# Patient Record
Sex: MALE | Race: WHITE | NOT HISPANIC OR LATINO | Employment: OTHER | URBAN - METROPOLITAN AREA
[De-identification: names, ages, dates, MRNs, and addresses within clinical notes are randomized per-mention and may not be internally consistent; named-entity substitution may affect disease eponyms.]

---

## 2018-08-29 ENCOUNTER — OFFICE VISIT (OUTPATIENT)
Dept: INTERNAL MEDICINE | Facility: CLINIC | Age: 63
End: 2018-08-29

## 2018-08-29 ENCOUNTER — OFFICE VISIT (OUTPATIENT)
Dept: BEHAVIORAL HEALTH | Facility: CLINIC | Age: 63
End: 2018-08-29

## 2018-08-29 VITALS
HEIGHT: 68 IN | TEMPERATURE: 98 F | DIASTOLIC BLOOD PRESSURE: 84 MMHG | SYSTOLIC BLOOD PRESSURE: 154 MMHG | RESPIRATION RATE: 16 BRPM | WEIGHT: 206 LBS | BODY MASS INDEX: 31.22 KG/M2 | OXYGEN SATURATION: 98 % | HEART RATE: 68 BPM

## 2018-08-29 DIAGNOSIS — I10 ESSENTIAL HYPERTENSION: ICD-10-CM

## 2018-08-29 DIAGNOSIS — F43.10 PTSD (POST-TRAUMATIC STRESS DISORDER): ICD-10-CM

## 2018-08-29 PROCEDURE — 99203 OFFICE O/P NEW LOW 30 MIN: CPT | Performed by: NURSE PRACTITIONER

## 2018-08-29 ASSESSMENT — ANXIETY QUESTIONNAIRES
3. WORRYING TOO MUCH ABOUT DIFFERENT THINGS: NEARLY EVERY DAY
7. FEELING AFRAID AS IF SOMETHING AWFUL MIGHT HAPPEN: NOT AT ALL
2. NOT BEING ABLE TO STOP OR CONTROL WORRYING: NEARLY EVERY DAY
5. BEING SO RESTLESS THAT IT IS HARD TO SIT STILL: NOT AT ALL
IF YOU CHECKED OFF ANY PROBLEMS ON THIS QUESTIONNAIRE, HOW DIFFICULT HAVE THESE PROBLEMS MADE IT FOR YOU TO DO YOUR WORK, TAKE CARE OF THINGS AT HOME, OR GET ALONG WITH OTHER PEOPLE: SOMEWHAT DIFFICULT
1. FEELING NERVOUS, ANXIOUS, OR ON EDGE: NEARLY EVERY DAY
6. BECOMING EASILY ANNOYED OR IRRITABLE: NEARLY EVERY DAY
GAD7 TOTAL SCORE: 12

## 2018-08-29 ASSESSMENT — PATIENT HEALTH QUESTIONNAIRE - PHQ9: 5. POOR APPETITE OR OVEREATING: NOT AT ALL

## 2018-08-29 NOTE — PROGRESS NOTES
"  SUBJECTIVE:   Rajeev Montalvo is a 62 year old male who presents to clinic today for the following health issues:      New Patient/Transfer of Care    Elevated BP at DOT physical  WG readings 150-200/  YULI diet, no exercise  Non smoker, frequent use marijuana \"butter\"  Anxiety      Problem list and histories reviewed & adjusted, as indicated.  Additional history: as documented    Patient Active Problem List   Diagnosis     HTN (hypertension)     History reviewed. No pertinent surgical history.    Social History   Substance Use Topics     Smoking status: Former Smoker     Smokeless tobacco: Never Used      Comment: marijuanna     Alcohol use Yes      Comment: rarely     Family History   Problem Relation Age of Onset     HEART DISEASE Father          No current outpatient prescriptions on file.     BP Readings from Last 3 Encounters:   08/29/18 154/84    Wt Readings from Last 3 Encounters:   08/29/18 206 lb (93.4 kg)                    Reviewed and updated as needed this visit by clinical staff  Tobacco  Allergies  Meds  Med Hx  Surg Hx  Fam Hx  Soc Hx      Reviewed and updated as needed this visit by Provider         ROS:  Constitutional, HEENT, cardiovascular, pulmonary, gi and gu systems are negative, except as otherwise noted.    OBJECTIVE:     /84 (BP Location: Right arm, Patient Position: Sitting, Cuff Size: Adult Large)  Pulse 68  Temp 98  F (36.7  C) (Oral)  Resp 16  Ht 5' 8\" (1.727 m)  Wt 206 lb (93.4 kg)  SpO2 98%  BMI 31.32 kg/m2  Body mass index is 31.32 kg/(m^2).  GENERAL: healthy, alert and no distress  NECK: no adenopathy, no asymmetry, masses, or scars and thyroid normal to palpation  RESP: lungs clear to auscultation - no rales, rhonchi or wheezes  CV: regular rate and rhythm, normal S1 S2, no S3 or S4, no murmur, click or rub, no peripheral edema and peripheral pulses strong  PSYCH: tangential and anxious        ASSESSMENT/PLAN:               ICD-10-CM    1. Essential " hypertension I10        YULI diet, exercise, wt loss  F/u BP 2-4 weeks  Warm hand off to Reba [facility] [place]    Triny Patel NP  [facility]

## 2018-08-29 NOTE — MR AVS SNAPSHOT
"              After Visit Summary   2018    Rajeev Montalvo    MRN: [id number]           Patient Information     Date Of Birth          1955        Visit Information        Provider Department      2018 11:00 AM Reba Sorto LMFT [facility]        Today's Diagnoses     PTSD (post-traumatic stress disorder)           Follow-ups after your visit        Who to contact     If you have questions or need follow up information about today's clinic visit or your schedule please contact [facility] directly at [phone].  Normal or non-critical lab and imaging results will be communicated to you by [employer], letter or phone within 4 business days after the clinic has received the results. If you do not hear from us within 7 days, please contact the clinic through [employer] or phone. If you have a critical or abnormal lab result, we will notify you by phone as soon as possible.  Submit refill requests through [employer] or call your pharmacy and they will forward the refill request to us. Please allow 3 business days for your refill to be completed.          Additional Information About Your Visit        MyChart Information     [employer] lets you send messages to your doctor, view your test results, renew your prescriptions, schedule appointments and more. To sign up, go to [email or web address] . Click on \"Log in\" on the left side of the screen, which will take you to the Welcome page. Then click on \"Sign up Now\" on the right side of the page.     You will be asked to enter the access code listed below, as well as some personal information. Please follow the directions to create your username and password.     Your access code is: [email or web address]  Expires: 2018 10:06 AM     Your access code will  in 90 days. If you need help or a new code, please call your [facility] or [phone].        Care EveryWhere ID     This is your Care EveryWhere ID. This could be " used by other organizations to access your [place] medical records  [id number]         Blood Pressure from Last 3 Encounters:   08/29/18 154/84    Weight from Last 3 Encounters:   08/29/18 93.4 kg (206 lb)              Today, you had the following     No orders found for display       Primary Care Provider Office Phone # Fax #    [facility] [phone]       [address] [place] [address]        Equal Access to Services     TULIO CLEMENT : Hadii aad ku hadasho Soomaali, waaxda luqadaha, qaybta kaalmada adeegyada, waxay idiin hayaan adeeg kharash la'aan . So [facility] [phone].    ATENCIÓN: Si habla español, tiene a hart disposición servicios gratuitos de asistencia lingüística. Giana al [phone].    We comply with applicable federal civil rights laws and [place] laws. We do not discriminate on the basis of race, color, national origin, age, disability, sex, sexual orientation, or gender identity.            Thank you!     Thank you for choosing [facility]  for your care. Our goal is always to provide you with excellent care. Hearing back from our patients is one way we can continue to improve our services. Please take a few minutes to complete the written survey that you may receive in the mail after your visit with us. Thank you!             Your Updated Medication List - Protect others around you: Learn how to safely use, store and throw away your medicines at www.disposemymeds.org.      Notice  As of 8/29/2018  3:05 PM    You have not been prescribed any medications.

## 2018-08-29 NOTE — PROGRESS NOTES
Patient had appointment with his/her primary care physician. [facility] services were requested / offered. No immediate safety/risk issues were reported or identified.  Explained the role of the [facility] and provided informational handout and contact information for the [facility]. Pt feels like therapy might be beneficial. Referred to pt to EMDR for his PTSD    Reba Sorto, Behavioral Health Clinician

## 2018-08-29 NOTE — MR AVS SNAPSHOT
"              After Visit Summary   2018    Rajeev Montalvo    MRN: [id number]           Patient Information     Date Of Birth          1955        Visit Information        Provider Department      2018 10:40 AM Triny Patel NP [facility]        Today's Diagnoses     Essential hypertension           Follow-ups after your visit        Who to contact     If you have questions or need follow up information about today's clinic visit or your schedule please contact [facility] directly at [phone].  Normal or non-critical lab and imaging results will be communicated to you by [employer], letter or phone within 4 business days after the clinic has received the results. If you do not hear from us within 7 days, please contact the clinic through [employer] or phone. If you have a critical or abnormal lab result, we will notify you by phone as soon as possible.  Submit refill requests through [employer] or call your pharmacy and they will forward the refill request to us. Please allow 3 business days for your refill to be completed.          Additional Information About Your Visit        MyChart Information     [employer] lets you send messages to your doctor, view your test results, renew your prescriptions, schedule appointments and more. To sign up, go to [email or web address] . Click on \"Log in\" on the left side of the screen, which will take you to the Welcome page. Then click on \"Sign up Now\" on the right side of the page.     You will be asked to enter the access code listed below, as well as some personal information. Please follow the directions to create your username and password.     Your access code is: [email or web address]  Expires: 2018 10:06 AM     Your access code will  in 90 days. If you need help or a new code, please call your [facility] or [phone].        Care EveryWhere ID     This is your Care EveryWhere ID. This could be used by other " "organizations to access your [place] medical records  [id number]        Your Vitals Were     Pulse Temperature Respirations Height Pulse Oximetry BMI (Body Mass Index)    68 98  F (36.7  C) (Oral) 16 5' 8\" (1.727 m) 98% 31.32 kg/m2       Blood Pressure from Last 3 Encounters:   08/29/18 154/84    Weight from Last 3 Encounters:   08/29/18 206 lb (93.4 kg)              Today, you had the following     No orders found for display       Primary Care Provider Office Phone # Fax #    [facility] [phone]       [address] [place] [address]        Equal Access to Services     TULIO CLEMENT : Hadii fareed ruby Soelsy, waaxda luqadaha, qaybta kaalmada adeegyada, yelena martinez. So [facility] [phone].    ATENCIÓN: Si habla español, tiene a hart disposición servicios gratuitos de asistencia lingüística. Llame al [phone].    We comply with applicable federal civil rights laws and [place] laws. We do not discriminate on the basis of race, color, national origin, age, disability, sex, sexual orientation, or gender identity.            Thank you!     Thank you for choosing [facility]  for your care. Our goal is always to provide you with excellent care. Hearing back from our patients is one way we can continue to improve our services. Please take a few minutes to complete the written survey that you may receive in the mail after your visit with us. Thank you!             Your Updated Medication List - Protect others around you: Learn how to safely use, store and throw away your medicines at www.disposemymeds.org.      Notice  As of 8/29/2018 11:26 AM    You have not been prescribed any medications.      "

## 2018-08-30 ASSESSMENT — PATIENT HEALTH QUESTIONNAIRE - PHQ9: SUM OF ALL RESPONSES TO PHQ QUESTIONS 1-9: 15

## 2018-08-30 ASSESSMENT — ANXIETY QUESTIONNAIRES: GAD7 TOTAL SCORE: 12

## 2018-11-05 ENCOUNTER — TELEPHONE (OUTPATIENT)
Dept: INTERNAL MEDICINE | Facility: CLINIC | Age: 63
End: 2018-11-05

## 2018-11-05 ENCOUNTER — OFFICE VISIT (OUTPATIENT)
Dept: INTERNAL MEDICINE | Facility: CLINIC | Age: 63
End: 2018-11-05

## 2018-11-05 VITALS
BODY MASS INDEX: 30.49 KG/M2 | RESPIRATION RATE: 16 BRPM | HEART RATE: 63 BPM | OXYGEN SATURATION: 97 % | SYSTOLIC BLOOD PRESSURE: 146 MMHG | WEIGHT: 201.2 LBS | TEMPERATURE: 98.3 F | DIASTOLIC BLOOD PRESSURE: 80 MMHG | HEIGHT: 68 IN

## 2018-11-05 DIAGNOSIS — I10 ESSENTIAL HYPERTENSION: Primary | ICD-10-CM

## 2018-11-05 PROCEDURE — 99213 OFFICE O/P EST LOW 20 MIN: CPT | Performed by: NURSE PRACTITIONER

## 2018-11-05 RX ORDER — HYDROCHLOROTHIAZIDE 25 MG/1
25 TABLET ORAL DAILY
Qty: 90 TABLET | Refills: 1 | Status: SHIPPED | OUTPATIENT
Start: 2018-11-05 | End: 2023-12-08

## 2018-11-05 NOTE — MR AVS SNAPSHOT
"              After Visit Summary   11/5/2018    Rajeev Montalvo    MRN: [id number]           Patient Information     Date Of Birth          1955        Visit Information        Provider Department      11/5/2018 1:20 PM Triny Patel NP [facility]        Today's Diagnoses     Essential hypertension    -  1       Follow-ups after your visit        Who to contact     If you have questions or need follow up information about today's clinic visit or your schedule please contact [facility] directly at [phone].  Normal or non-critical lab and imaging results will be communicated to you by MyChart, letter or phone within 4 business days after the clinic has received the results. If you do not hear from us within 7 days, please contact the clinic through MyChart or phone. If you have a critical or abnormal lab result, we will notify you by phone as soon as possible.  Submit refill requests through [employer] or call your pharmacy and they will forward the refill request to us. Please allow 3 business days for your refill to be completed.          Additional Information About Your Visit        Care EveryWhere ID     This is your Care EveryWhere ID. This could be used by other organizations to access your [place] medical records  [id number]        Your Vitals Were     Pulse Temperature Respirations Height Pulse Oximetry BMI (Body Mass Index)    63 98.3  F (36.8  C) (Oral) 16 5' 8\" (1.727 m) 97% 30.59 kg/m2       Blood Pressure from Last 3 Encounters:   11/05/18 146/80   08/29/18 154/84    Weight from Last 3 Encounters:   11/05/18 201 lb 3.2 oz (91.3 kg)   08/29/18 206 lb (93.4 kg)              Today, you had the following     No orders found for display         Today's Medication Changes          These changes are accurate as of 11/5/18  3:03 PM.  If you have any questions, ask your nurse or doctor.               Start taking these medicines.        Dose/Directions    " hydrochlorothiazide 25 MG tablet   Commonly known as:  HYDRODIURIL   Used for:  Essential hypertension   Started by:  Triny Patel NP        Dose:  25 mg   Take 1 tablet (25 mg) by mouth daily   Quantity:  90 tablet   Refills:  1            Where to get your medicines      These medications were sent to [id number] [address] [facility]  [address] [facility] [address]     Phone:  [phone]     hydrochlorothiazide 25 MG tablet                Primary Care Provider Office Phone # Fax #    [facility] [phone]       [address] [place] [address]        Equal Access to Services     AMINA CLEMENT : Hadii fareed jackson hadasho Soomaali, waaxda luqadaha, qaybta kaalmada adeegyada, yelena martinez. So [facility] [phone].    ATENCIÓN: Si habla español, tiene a hart disposición servicios gratuitos de asistencia lingüística. Llame al [phone].    We comply with applicable federal civil rights laws and [place] laws. We do not discriminate on the basis of race, color, national origin, age, disability, sex, sexual orientation, or gender identity.            Thank you!     Thank you for choosing [facility]  for your care. Our goal is always to provide you with excellent care. Hearing back from our patients is one way we can continue to improve our services. Please take a few minutes to complete the written survey that you may receive in the mail after your visit with us. Thank you!             Your Updated Medication List - Protect others around you: Learn how to safely use, store and throw away your medicines at www.disposemymeds.org.          This list is accurate as of 11/5/18  3:03 PM.  Always use your most recent med list.                   Brand Name Dispense Instructions for use Diagnosis    hydrochlorothiazide 25 MG tablet    HYDRODIURIL    90 tablet    Take 1 tablet (25 mg) by mouth daily     Essential hypertension

## 2018-11-05 NOTE — TELEPHONE ENCOUNTER
PT calls stating he bought a BP cuff since his last OV.    His BP has been elevated since.     180/100, 160/90.     He has no insurance. He is not on any BP medication.   Reviewed with Triny Patel NP.     He needs OV, since need to check BP in office.     Scheduled for today.     He agrees.

## 2018-11-05 NOTE — PROGRESS NOTES
"  SUBJECTIVE:   Rajeev Montalvo is a 63 year old male who presents to clinic today for the following health issues:      Hypertension Follow-up      Outpatient blood pressures are being checked at home.  Results are 160-180/80-95.    Low Salt Diet: no added salt      Amount of exercise or physical activity: very physical job, able to lose 5#    Problems taking medications regularly: NA    Medication side effects: not applicable    Diet: low salt            Problem list and histories reviewed & adjusted, as indicated.  Additional history: as documented    Patient Active Problem List   Diagnosis     HTN (hypertension)     PTSD (post-traumatic stress disorder)     History reviewed. No pertinent surgical history.    Social History   Substance Use Topics     Smoking status: Former Smoker     Smokeless tobacco: Never Used      Comment: marijuanna     Alcohol use Yes      Comment: rarely     Family History   Problem Relation Age of Onset     HEART DISEASE Father          Current Outpatient Prescriptions   Medication Sig Dispense Refill     hydrochlorothiazide (HYDRODIURIL) 25 MG tablet Take 1 tablet (25 mg) by mouth daily 90 tablet 1     BP Readings from Last 3 Encounters:   11/05/18 146/80   08/29/18 154/84    Wt Readings from Last 3 Encounters:   11/05/18 201 lb 3.2 oz (91.3 kg)   08/29/18 206 lb (93.4 kg)                    Reviewed and updated as needed this visit by clinical staff  Tobacco  Allergies  Meds  Med Hx  Surg Hx  Fam Hx  Soc Hx      Reviewed and updated as needed this visit by Provider         ROS:  Constitutional, HEENT, cardiovascular, pulmonary, gi and gu systems are negative, except as otherwise noted.    OBJECTIVE:     /80 (BP Location: Left arm, Patient Position: Sitting, Cuff Size: Adult Regular)  Pulse 63  Temp 98.3  F (36.8  C) (Oral)  Resp 16  Ht 5' 8\" (1.727 m)  Wt 201 lb 3.2 oz (91.3 kg)  SpO2 97%  BMI 30.59 kg/m2  Body mass index is 30.59 kg/(m^2).  GENERAL: healthy, alert " and no distress  RESP: lungs clear to auscultation - no rales, rhonchi or wheezes  CV: regular rate and rhythm, normal S1 S2, no S3 or S4, no murmur, click or rub, no peripheral edema and peripheral pulses strong        ASSESSMENT/PLAN:               ICD-10-CM    1. Essential hypertension I10 hydrochlorothiazide (HYDRODIURIL) 25 MG tablet       Pt  Reluctant to take any meds due to cost and SE.  Moving to [facility] will continue wt loss efforts and start hydrochlorothiazide if readings not improving.  Goal <135/85    Triny Patel NP  [facility]

## 2019-04-25 ENCOUNTER — TELEPHONE (OUTPATIENT)
Dept: INTERNAL MEDICINE | Facility: CLINIC | Age: 64
End: 2019-04-25

## 2019-04-25 NOTE — LETTER
[facility]  [address], [address]  [place], [place]  [address]                                            TEL:[phone]  FAX:[phone]      Rajeev Montalvo  [address] [address]      April 25, 2019    Dear Rajeev,       At [facility] we care about your health and well-being.  A review of your chart has indicated that you are due for a blood pressure follow up and colonoscopy.  Please contact us at [phone] to schedule an appointment.    If you have already had one or all of the above screening tests at another facility, please call us to update your chart.       Sincerely,      Your [facility] Team

## 2019-04-25 NOTE — TELEPHONE ENCOUNTER
Panel Management Review      Patient has the following on his problem list:     Hypertension   Last three blood pressure readings:  BP Readings from Last 3 Encounters:   11/05/18 146/80   08/29/18 154/84     Blood pressure: FAILED    HTN Guidelines:  Less than 140/90      Composite cancer screening  Chart review shows that this patient is due/due soon for the following Colonoscopy  Summary:    Patient is due/failing the following:   BP CHECK and COLONOSCOPY    Action needed:   Patient needs office visit for blood pressure follow up. Colonoscopy.    Type of outreach:    Sent letter.    Questions for provider review:    None                                                                                                                                    [facility]       Chart routed to closed .

## 2019-08-05 ENCOUNTER — NURSE TRIAGE (OUTPATIENT)
Dept: INTERNAL MEDICINE | Facility: CLINIC | Age: 64
End: 2019-08-05

## 2019-08-05 DIAGNOSIS — I10 ESSENTIAL HYPERTENSION: Primary | ICD-10-CM

## 2019-08-05 RX ORDER — AMLODIPINE BESYLATE 10 MG/1
10 TABLET ORAL DAILY
Qty: 30 TABLET | Refills: 0 | Status: SHIPPED | OUTPATIENT
Start: 2019-08-05 | End: 2019-09-09

## 2019-08-05 RX ORDER — AMLODIPINE BESYLATE 10 MG/1
10 TABLET ORAL DAILY
Qty: 90 TABLET | Refills: 0 | Status: CANCELLED
Start: 2019-08-05

## 2019-08-05 NOTE — TELEPHONE ENCOUNTER
Contacted patient, he is not having any pain from the tooth extraction. Informed patient Dr. Estrada recommends restarting BP medication and offered prescription for Norvasc as alternative to hydrochlorothiazide if this was not helping. Follow-up in 1-2 weeks.     Patient would like to try Norvasc as an alternative, please send prescription to [facility].     Offered to assist patient with scheduling follow-up appointment, but he declined.

## 2019-08-05 NOTE — TELEPHONE ENCOUNTER
Is his dental pain under control?  Pain can increase bp.     Recommend patient return on bp medication.    Could have him take norvasc instead if he thinks hydrochlorothiazide was not working.  Recommend he take norvasc medication and follow up in 1-2 weeks.    Patient to go to the ER with any concerning symptoms that were already reviewed in protocol.

## 2019-08-05 NOTE — TELEPHONE ENCOUNTER
"Patient calling.  States his blood pressure has been elevated the past couple days--ranging 200's/100's.  Today (approx 15 min ago) was 203/107 and 5 min later was 189/107 with home blood pressure monitor.  Patient did state his job is very stressful and has been on antibiotics (Zpack and Clindamycin 150mg TID) for infected tooth that was pulled.  States he did stop Clindamycin 8-4-19 d/t thought medication was increasing blood pressure.    Stopped taking his Hydrochlorothiazide 25mg daily medication x \"months\" ago d/t he didn't feel it was helping.    Routed to covering provider d/t Triny Patel not in clinic today.    Last office visit 11-5-18    Please advise, thanks.    Additional Information    Negative: Sounds like a life-threatening emergency to the triager    Negative: Pregnant > 20 weeks and new hand or face swelling    Negative: Pregnant > 20 weeks and BP > 140/90    Negative: Systolic BP >= 160 OR Diastolic >= 100, and any cardiac or neurologic symptoms (e.g., chest pain, difficulty breathing, unsteady gait, blurred vision)    Negative: Patient sounds very sick or weak to the triager    Negative: BP Systolic BP >= 140 OR Diastolic >= 90 and postpartum < 4 weeks    Systolic BP >= 180 OR Diastolic >= 110, and missed most recent dose of blood pressure medication    Answer Assessment - Initial Assessment Questions  1. BLOOD PRESSURE: \"What is the blood pressure?\" \"Did you take at least two measurements 5 minutes apart?\"      Yes--First one was 203/107 and 2nd was 189/107.  2. ONSET: \"When did you take your blood pressure?\"      This morning--15 min ago  3. HOW: \"How did you obtain the blood pressure?\" (e.g., visiting nurse, automatic home BP monitor)      Home blood pressure monitor  4. HISTORY: \"Do you have a history of high blood pressure?\"      Yes  5. MEDICATIONS: \"Are you taking any medications for blood pressure?\" \"Have you missed any doses recently?\"      No--he stopped taking his hydrochlorothiazide " "x \"months\"ago d/t he didn't feel it was helping.  6. OTHER SYMPTOMS: \"Do you have any symptoms?\" (e.g., headache, chest pain, blurred vision, difficulty breathing, weakness)      Denies any other symptoms.  7. PREGNANCY: \"Is there any chance you are pregnant?\" \"When was your last menstrual period?\"      NA    Protocols used: HIGH BLOOD PRESSURE-A-OH      "

## 2019-09-09 DIAGNOSIS — I10 ESSENTIAL HYPERTENSION: ICD-10-CM

## 2019-09-09 RX ORDER — AMLODIPINE BESYLATE 10 MG/1
10 TABLET ORAL DAILY
Qty: 30 TABLET | Refills: 0 | Status: SHIPPED | OUTPATIENT
Start: 2019-09-09 | End: 2019-10-10

## 2019-09-09 NOTE — TELEPHONE ENCOUNTER
"Requested Prescriptions   Pending Prescriptions Disp Refills     amLODIPine (NORVASC) 10 MG tablet  Last Written Prescription Date:  8/5/19  Last Fill Quantity: 30,  # refills: 0   Last office visit: 11/5/2018 with prescribing provider:  Jorge   Future Office Visit:   Next 5 appointments (look out 90 days)    Oct 02, 2019  9:40 AM CDT  SHORT with Shannon Estrada MD  [facility] [address]  [facility] [address]  [phone]          30 tablet 0     Sig: Take 1 tablet (10 mg) by mouth daily       Calcium Channel Blockers Protocol  Failed - 9/9/2019  9:21 AM        Failed - Blood pressure under 140/90 in past 12 months     BP Readings from Last 3 Encounters:   11/05/18 146/80   08/29/18 154/84                 Failed - Normal serum creatinine on file in past 12 months     No lab results found.          Passed - Recent (12 mo) or future (30 days) visit within the authorizing provider's specialty     Patient had office visit in the last 12 months or has a visit in the next 30 days with authorizing provider or within the authorizing provider's specialty.  See \"Patient Info\" tab in inbasket, or \"Choose Columns\" in Meds & Orders section of the refill encounter.              Passed - Medication is active on med list        Passed - Patient is age 18 or older          "

## 2019-09-09 NOTE — TELEPHONE ENCOUNTER
Routing refill request to provider for review/approval because:  Labs not current:  Creatinine  BP elevated

## 2019-10-02 ENCOUNTER — OFFICE VISIT (OUTPATIENT)
Dept: INTERNAL MEDICINE | Facility: CLINIC | Age: 64
End: 2019-10-02

## 2019-10-02 ENCOUNTER — TELEPHONE (OUTPATIENT)
Dept: INTERNAL MEDICINE | Facility: CLINIC | Age: 64
End: 2019-10-02

## 2019-10-02 ENCOUNTER — ANCILLARY PROCEDURE (OUTPATIENT)
Dept: GENERAL RADIOLOGY | Facility: CLINIC | Age: 64
End: 2019-10-02
Attending: INTERNAL MEDICINE

## 2019-10-02 VITALS
DIASTOLIC BLOOD PRESSURE: 72 MMHG | BODY MASS INDEX: 29.55 KG/M2 | TEMPERATURE: 98 F | RESPIRATION RATE: 12 BRPM | SYSTOLIC BLOOD PRESSURE: 122 MMHG | HEART RATE: 80 BPM | OXYGEN SATURATION: 97 % | HEIGHT: 68 IN | WEIGHT: 195 LBS

## 2019-10-02 DIAGNOSIS — R09.89 PHLEGM IN THROAT: ICD-10-CM

## 2019-10-02 DIAGNOSIS — R05.9 COUGH: ICD-10-CM

## 2019-10-02 DIAGNOSIS — Z12.11 COLON CANCER SCREENING: ICD-10-CM

## 2019-10-02 DIAGNOSIS — I10 ESSENTIAL HYPERTENSION: Primary | ICD-10-CM

## 2019-10-02 LAB
BASOPHILS # BLD AUTO: 0 10E9/L (ref 0–0.2)
BASOPHILS NFR BLD AUTO: 0.3 %
DIFFERENTIAL METHOD BLD: NORMAL
EOSINOPHIL # BLD AUTO: 0.3 10E9/L (ref 0–0.7)
EOSINOPHIL NFR BLD AUTO: 3.7 %
ERYTHROCYTE [DISTWIDTH] IN BLOOD BY AUTOMATED COUNT: 13 % (ref 10–15)
HCT VFR BLD AUTO: 43.4 % (ref 40–53)
HGB BLD-MCNC: 15.4 G/DL (ref 13.3–17.7)
LYMPHOCYTES # BLD AUTO: 3.6 10E9/L (ref 0.8–5.3)
LYMPHOCYTES NFR BLD AUTO: 39.5 %
MCH RBC QN AUTO: 31.8 PG (ref 26.5–33)
MCHC RBC AUTO-ENTMCNC: 35.5 G/DL (ref 31.5–36.5)
MCV RBC AUTO: 90 FL (ref 78–100)
MONOCYTES # BLD AUTO: 0.7 10E9/L (ref 0–1.3)
MONOCYTES NFR BLD AUTO: 7.8 %
NEUTROPHILS # BLD AUTO: 4.4 10E9/L (ref 1.6–8.3)
NEUTROPHILS NFR BLD AUTO: 48.7 %
PLATELET # BLD AUTO: 185 10E9/L (ref 150–450)
RBC # BLD AUTO: 4.85 10E12/L (ref 4.4–5.9)
WBC # BLD AUTO: 9.1 10E9/L (ref 4–11)

## 2019-10-02 PROCEDURE — 93000 ELECTROCARDIOGRAM COMPLETE: CPT | Performed by: INTERNAL MEDICINE

## 2019-10-02 PROCEDURE — 85025 COMPLETE CBC W/AUTO DIFF WBC: CPT | Performed by: INTERNAL MEDICINE

## 2019-10-02 PROCEDURE — 80048 BASIC METABOLIC PNL TOTAL CA: CPT | Performed by: INTERNAL MEDICINE

## 2019-10-02 PROCEDURE — 99214 OFFICE O/P EST MOD 30 MIN: CPT | Performed by: INTERNAL MEDICINE

## 2019-10-02 PROCEDURE — 71046 X-RAY EXAM CHEST 2 VIEWS: CPT

## 2019-10-02 PROCEDURE — 36415 COLL VENOUS BLD VENIPUNCTURE: CPT | Performed by: INTERNAL MEDICINE

## 2019-10-02 PROCEDURE — 82043 UR ALBUMIN QUANTITATIVE: CPT | Performed by: INTERNAL MEDICINE

## 2019-10-02 ASSESSMENT — MIFFLIN-ST. JEOR: SCORE: 1659.01

## 2019-10-02 NOTE — PROGRESS NOTES
"Subjective     Rajeev Montalvo is a 63 year old male who presents to clinic today for the following health issues:    HPI   Hypertension Follow-up      Do you check your blood pressure regularly outside of the clinic? Yes     Are you following a low salt diet? No    Are your blood pressures ever more than 140 on the top number (systolic) OR more   than 90 on the bottom number (diastolic), for example 140/90? Yes   >140/90    Does not do regular exercise.       How many servings of fruits and vegetables do you eat daily?  0-1    On average, how many sweetened beverages do you drink each day (soda, juice, sweet tea, etc)?   0    How many days per week do you miss taking your medication? 0    Phlegm.  He has been coughing up phlegm for 10 years. The cough is productive.   No shortness of breath.  He rarely has wheezing.  He notices this when he is sleeping sometimes.  The cough is constant, and not necessarily more at night.   No postnasal drip.  No nasal congestion. No heartburn appreciated.        Reviewed and updated as needed this visit by Provider  Meds         Review of Systems   ROS COMP: CONSTITUTIONAL: NEGATIVE for fever, chills, change in weight  ENT/MOUTH: POS phlegm  RESP: NEGATIVE for significant cough or SOB  CV: NEGATIVE for chest pain, palpitations or peripheral edema      Objective    /72   Pulse 80   Temp 98  F (36.7  C) (Oral)   Resp 12   Ht 1.727 m (5' 8\")   Wt 88.5 kg (195 lb)   SpO2 97%   BMI 29.65 kg/m    Body mass index is 29.65 kg/m .  Physical Exam   GENERAL: healthy, alert and no distress  HEENT: oropharynx without lesions appreciated  NECK: no adenopathy, no asymmetry, masses, or scars and thyroid normal to palpation  RESP: lungs clear to auscultation - no rales, rhonchi or wheezes  CV: regular rate and rhythm, normal S1 S2, no S3 or S4, no murmur, click or rub    Diagnostic Test Results:  Labs reviewed in Epic        Assessment & Plan       (I10) Essential hypertension  " "(primary encounter diagnosis)  Comment: at goal  Plan: Basic metabolic panel, CBC with platelets         differential, Albumin Random Urine Quantitative        with Creat Ratio, EKG 12-lead complete w/read -        Clinics            (R09.89) Phlegm in throat  Comment:   Plan: OTOLARYNGOLOGY REFERRAL            (R05) Cough  Comment: assess for pulmonary etiology  Plan: XR Chest 2 Views            (Z12.11) Colon cancer screening  Comment:   Plan: Fecal colorectal cancer screen (FIT)                 BMI:   Estimated body mass index is 29.65 kg/m  as calculated from the following:    Height as of this encounter: 1.727 m (5' 8\").    Weight as of this encounter: 88.5 kg (195 lb).           See Patient Instructions    Return in about 1 year (around 10/2/2020) for Physical Exam.    Shannon Estrada MD  [facility]    "

## 2019-10-02 NOTE — TELEPHONE ENCOUNTER
Please call patient ASAP with X-ray results. He is very concerned. [phone] (ok to leave detailed message)

## 2019-10-02 NOTE — TELEPHONE ENCOUNTER
"Per 10/2/19 chest xr:  \"Notes recorded by Shannon Estrada MD on 10/2/2019 at 1:33 PM CDT  Chest xray is clear\"    Called patient and advised. He would like to know if primary care provider has any concern for the white spot on xray in the upper left lung, and if primary care provider has an idea of what that spot could be.    Patient can be contacted at [phone]- [phone] to leave a detailed message  "

## 2019-10-03 LAB
ANION GAP SERPL CALCULATED.3IONS-SCNC: 5 MMOL/L (ref 3–14)
BUN SERPL-MCNC: 17 MG/DL (ref 7–30)
CALCIUM SERPL-MCNC: 9.1 MG/DL (ref 8.5–10.1)
CHLORIDE SERPL-SCNC: 102 MMOL/L (ref 94–109)
CO2 SERPL-SCNC: 29 MMOL/L (ref 20–32)
CREAT SERPL-MCNC: 1.03 MG/DL (ref 0.66–1.25)
CREAT UR-MCNC: 243 MG/DL
GFR SERPL CREATININE-BSD FRML MDRD: 77 ML/MIN/{1.73_M2}
GLUCOSE SERPL-MCNC: 121 MG/DL (ref 70–99)
MICROALBUMIN UR-MCNC: 22 MG/L
MICROALBUMIN/CREAT UR: 9.22 MG/G CR (ref 0–17)
POTASSIUM SERPL-SCNC: 3.7 MMOL/L (ref 3.4–5.3)
SODIUM SERPL-SCNC: 136 MMOL/L (ref 133–144)

## 2019-10-03 NOTE — TELEPHONE ENCOUNTER
Called patient and left a message that the area on his lung was a normal variant.    Also explained to him that he had a compression deformity of his lumbar spine that was possibly from an old injury.

## 2019-10-10 DIAGNOSIS — I10 ESSENTIAL HYPERTENSION: ICD-10-CM

## 2019-10-10 RX ORDER — AMLODIPINE BESYLATE 10 MG/1
TABLET ORAL
Qty: 90 TABLET | Refills: 3 | Status: SHIPPED | OUTPATIENT
Start: 2019-10-10

## 2019-10-10 NOTE — TELEPHONE ENCOUNTER
Last OV 10/2/19, /72, per OV notes return in 1 year.  Prescription approved per [facility] Refill Protocol.

## 2019-10-29 ENCOUNTER — HEALTH MAINTENANCE LETTER (OUTPATIENT)
Age: 64
End: 2019-10-29

## 2021-01-15 ENCOUNTER — HEALTH MAINTENANCE LETTER (OUTPATIENT)
Age: 66
End: 2021-01-15

## 2021-09-04 ENCOUNTER — HEALTH MAINTENANCE LETTER (OUTPATIENT)
Age: 66
End: 2021-09-04

## 2021-12-25 ENCOUNTER — HEALTH MAINTENANCE LETTER (OUTPATIENT)
Age: 66
End: 2021-12-25

## 2022-10-16 ENCOUNTER — HEALTH MAINTENANCE LETTER (OUTPATIENT)
Age: 67
End: 2022-10-16

## 2023-04-01 ENCOUNTER — HEALTH MAINTENANCE LETTER (OUTPATIENT)
Age: 68
End: 2023-04-01

## 2023-12-05 ENCOUNTER — NURSE TRIAGE (OUTPATIENT)
Dept: FAMILY MEDICINE | Facility: CLINIC | Age: 68
End: 2023-12-05
Payer: MEDICARE

## 2023-12-05 NOTE — TELEPHONE ENCOUNTER
Patient thinks he has had a stroke.    He is losing the ability to speak 2 months ago.  He states this is getting worse.    He is having trouble moving his tongue for 2 months.    He states he has white bumps on his tongue and his lips.    Per protocol patient advised to go to the ER. He states he does not have insurance. He will not go to urgent care either. He would like to be seen by clinic in [facility].    Thalia Arredondo RN on 12/5/2023 at 10:39 AM      Reason for Disposition   Neurologic deficit that was brief (now gone), ANY of the following: * Weakness of the face, arm, or leg on one side of the body * Numbness of the face, arm, or leg on one side of the body * Loss of speech or garbled speech    Additional Information   Negative: Difficult to awaken or acting confused (e.g., disoriented, slurred speech)   Negative: New neurologic deficit that is present NOW, sudden onset of ANY of the following: * Weakness of the face, arm, or leg on one side of the body* Numbness of the face, arm, or leg on one side of the body* Loss of speech or garbled speech   Negative: Sounds like a life-threatening emergency to the triager   Negative: SEVERE weakness (i.e., unable to walk or barely able to walk, requires support) and new-onset or worsening   Negative: Confusion, disorientation, or hallucinations is main symptom   Negative: Dizziness is main symptom   Negative: Followed a head injury within last 3 days   Negative: Headache (with neurologic deficit)   Negative: Unable to urinate (or only a few drops) and bladder feels very full   Negative: Loss of bladder or bowel control (urine or bowel incontinence; wetting self, leaking stool) of new-onset   Negative: Back pain with numbness (loss of sensation) in groin or rectal area    Protocols used: Neurologic Deficit-A-[place]

## 2023-12-08 ENCOUNTER — HOSPITAL ENCOUNTER (OUTPATIENT)
Dept: MRI IMAGING | Facility: CLINIC | Age: 68
Discharge: HOME | End: 2023-12-08
Attending: PHYSICIAN ASSISTANT
Payer: MEDICARE

## 2023-12-08 ENCOUNTER — OFFICE VISIT (OUTPATIENT)
Dept: PEDIATRICS | Facility: CLINIC | Age: 68
End: 2023-12-08
Payer: MEDICARE

## 2023-12-08 ENCOUNTER — OFFICE VISIT (OUTPATIENT)
Dept: INTERNAL MEDICINE | Facility: CLINIC | Age: 68
End: 2023-12-08
Payer: MEDICARE

## 2023-12-08 VITALS
DIASTOLIC BLOOD PRESSURE: 83 MMHG | SYSTOLIC BLOOD PRESSURE: 165 MMHG | HEART RATE: 51 BPM | RESPIRATION RATE: 18 BRPM | OXYGEN SATURATION: 98 % | TEMPERATURE: 97.6 F | BODY MASS INDEX: 26.61 KG/M2 | WEIGHT: 175 LBS

## 2023-12-08 VITALS
HEART RATE: 70 BPM | OXYGEN SATURATION: 98 % | BODY MASS INDEX: 26.52 KG/M2 | SYSTOLIC BLOOD PRESSURE: 156 MMHG | TEMPERATURE: 98.6 F | HEIGHT: 68 IN | WEIGHT: 175 LBS | DIASTOLIC BLOOD PRESSURE: 78 MMHG | RESPIRATION RATE: 16 BRPM

## 2023-12-08 DIAGNOSIS — K44.9 HIATAL HERNIA: ICD-10-CM

## 2023-12-08 DIAGNOSIS — R47.9 SPEECH DISTURBANCE, UNSPECIFIED TYPE: Primary | ICD-10-CM

## 2023-12-08 DIAGNOSIS — K21.00 GASTROESOPHAGEAL REFLUX DISEASE WITH ESOPHAGITIS WITHOUT HEMORRHAGE: ICD-10-CM

## 2023-12-08 DIAGNOSIS — I10 BENIGN ESSENTIAL HYPERTENSION: ICD-10-CM

## 2023-12-08 DIAGNOSIS — I65.02 STENOSIS OF LEFT VERTEBRAL ARTERY: ICD-10-CM

## 2023-12-08 DIAGNOSIS — B00.9 HSV (HERPES SIMPLEX VIRUS) INFECTION: ICD-10-CM

## 2023-12-08 DIAGNOSIS — H60.392 INFECTIVE OTITIS EXTERNA, LEFT: ICD-10-CM

## 2023-12-08 DIAGNOSIS — R47.1 DYSARTHRIA: ICD-10-CM

## 2023-12-08 DIAGNOSIS — B96.89 ACUTE BACTERIAL SINUSITIS: ICD-10-CM

## 2023-12-08 DIAGNOSIS — Z11.59 NEED FOR HEPATITIS C SCREENING TEST: ICD-10-CM

## 2023-12-08 DIAGNOSIS — J01.90 ACUTE BACTERIAL SINUSITIS: ICD-10-CM

## 2023-12-08 DIAGNOSIS — Z12.11 SCREEN FOR COLON CANCER: ICD-10-CM

## 2023-12-08 DIAGNOSIS — R13.10 DYSPHAGIA, UNSPECIFIED TYPE: ICD-10-CM

## 2023-12-08 DIAGNOSIS — R47.1 DYSARTHRIA: Primary | ICD-10-CM

## 2023-12-08 LAB
ALBUMIN SERPL BCG-MCNC: 4.9 G/DL (ref 3.5–5.2)
ALP SERPL-CCNC: 89 U/L (ref 40–150)
ALT SERPL W P-5'-P-CCNC: 22 U/L (ref 0–70)
ANION GAP SERPL CALCULATED.3IONS-SCNC: 13 MMOL/L (ref 7–15)
AST SERPL W P-5'-P-CCNC: 22 U/L (ref 0–45)
BASOPHILS # BLD AUTO: 0 10E3/UL (ref 0–0.2)
BASOPHILS NFR BLD AUTO: 1 %
BILIRUB SERPL-MCNC: 2.4 MG/DL
BUN SERPL-MCNC: 18.1 MG/DL (ref 8–23)
CALCIUM SERPL-MCNC: 9.4 MG/DL (ref 8.8–10.2)
CHLORIDE SERPL-SCNC: 97 MMOL/L (ref 98–107)
CHOLEST SERPL-MCNC: 183 MG/DL
CREAT SERPL-MCNC: 1.18 MG/DL (ref 0.67–1.17)
DEPRECATED HCO3 PLAS-SCNC: 25 MMOL/L (ref 22–29)
EGFRCR SERPLBLD CKD-EPI 2021: 68 ML/MIN/1.73M2
EOSINOPHIL # BLD AUTO: 0.1 10E3/UL (ref 0–0.7)
EOSINOPHIL NFR BLD AUTO: 2 %
ERYTHROCYTE [DISTWIDTH] IN BLOOD BY AUTOMATED COUNT: 12.2 % (ref 10–15)
FASTING STATUS PATIENT QL REPORTED: YES
GLUCOSE SERPL-MCNC: 100 MG/DL (ref 70–99)
HCT VFR BLD AUTO: 46.3 % (ref 40–53)
HCV AB SERPL QL IA: NONREACTIVE
HDLC SERPL-MCNC: 25 MG/DL
HGB BLD-MCNC: 15.8 G/DL (ref 13.3–17.7)
IMM GRANULOCYTES # BLD: 0 10E3/UL
IMM GRANULOCYTES NFR BLD: 0 %
LDLC SERPL CALC-MCNC: 121 MG/DL
LYMPHOCYTES # BLD AUTO: 2.1 10E3/UL (ref 0.8–5.3)
LYMPHOCYTES NFR BLD AUTO: 35 %
MCH RBC QN AUTO: 30.6 PG (ref 26.5–33)
MCHC RBC AUTO-ENTMCNC: 34.1 G/DL (ref 31.5–36.5)
MCV RBC AUTO: 90 FL (ref 78–100)
MONOCYTES # BLD AUTO: 0.6 10E3/UL (ref 0–1.3)
MONOCYTES NFR BLD AUTO: 10 %
NEUTROPHILS # BLD AUTO: 3.2 10E3/UL (ref 1.6–8.3)
NEUTROPHILS NFR BLD AUTO: 52 %
NONHDLC SERPL-MCNC: 158 MG/DL
NRBC # BLD AUTO: 0 10E3/UL
NRBC BLD AUTO-RTO: 0 /100
PLATELET # BLD AUTO: 141 10E3/UL (ref 150–450)
POTASSIUM SERPL-SCNC: 4.4 MMOL/L (ref 3.4–5.3)
PROT SERPL-MCNC: 7.8 G/DL (ref 6.4–8.3)
RBC # BLD AUTO: 5.17 10E6/UL (ref 4.4–5.9)
SODIUM SERPL-SCNC: 135 MMOL/L (ref 135–145)
TRIGL SERPL-MCNC: 183 MG/DL
TSH SERPL DL<=0.005 MIU/L-ACNC: 2.8 UIU/ML (ref 0.3–4.2)
WBC # BLD AUTO: 6.1 10E3/UL (ref 4–11)

## 2023-12-08 PROCEDURE — 85025 COMPLETE CBC W/AUTO DIFF WBC: CPT | Performed by: PHYSICIAN ASSISTANT

## 2023-12-08 PROCEDURE — 84443 ASSAY THYROID STIM HORMONE: CPT | Performed by: PHYSICIAN ASSISTANT

## 2023-12-08 PROCEDURE — 99207 REFERRAL TO ACUTE AND DIAGNOSTIC SERVICES: CPT | Performed by: NURSE PRACTITIONER

## 2023-12-08 PROCEDURE — A9585 GADOBUTROL INJECTION: HCPCS | Performed by: PHYSICIAN ASSISTANT

## 2023-12-08 PROCEDURE — 36415 COLL VENOUS BLD VENIPUNCTURE: CPT | Performed by: PHYSICIAN ASSISTANT

## 2023-12-08 PROCEDURE — 86803 HEPATITIS C AB TEST: CPT | Performed by: PHYSICIAN ASSISTANT

## 2023-12-08 PROCEDURE — 255N000002 HC RX 255 OP 636: Performed by: PHYSICIAN ASSISTANT

## 2023-12-08 PROCEDURE — 70553 MRI BRAIN STEM W/O & W/DYE: CPT

## 2023-12-08 PROCEDURE — 70549 MR ANGIOGRAPH NECK W/O&W/DYE: CPT

## 2023-12-08 PROCEDURE — 80061 LIPID PANEL: CPT | Performed by: PHYSICIAN ASSISTANT

## 2023-12-08 PROCEDURE — 70544 MR ANGIOGRAPHY HEAD W/O DYE: CPT

## 2023-12-08 PROCEDURE — 99204 OFFICE O/P NEW MOD 45 MIN: CPT | Performed by: PHYSICIAN ASSISTANT

## 2023-12-08 PROCEDURE — 80053 COMPREHEN METABOLIC PANEL: CPT | Performed by: PHYSICIAN ASSISTANT

## 2023-12-08 RX ORDER — GADOBUTROL 604.72 MG/ML
10 INJECTION INTRAVENOUS ONCE
Status: COMPLETED | OUTPATIENT
Start: 2023-12-08 | End: 2023-12-08

## 2023-12-08 RX ORDER — NEOMYCIN SULFATE, POLYMYXIN B SULFATE AND HYDROCORTISONE 10; 3.5; 1 MG/ML; MG/ML; [USP'U]/ML
SUSPENSION/ DROPS AURICULAR (OTIC)
Qty: 10 ML | Refills: 0 | Status: SHIPPED | OUTPATIENT
Start: 2023-12-08

## 2023-12-08 RX ORDER — VALACYCLOVIR HYDROCHLORIDE 1 G/1
1000 TABLET, FILM COATED ORAL 3 TIMES DAILY
Qty: 21 TABLET | Refills: 0 | Status: SHIPPED | OUTPATIENT
Start: 2023-12-08 | End: 2023-12-15

## 2023-12-08 RX ADMIN — GADOBUTROL 10 ML: 604.72 INJECTION INTRAVENOUS at 10:55

## 2023-12-08 ASSESSMENT — PATIENT HEALTH QUESTIONNAIRE - PHQ9
10. IF YOU CHECKED OFF ANY PROBLEMS, HOW DIFFICULT HAVE THESE PROBLEMS MADE IT FOR YOU TO DO YOUR WORK, TAKE CARE OF THINGS AT HOME, OR GET ALONG WITH OTHER PEOPLE: NOT DIFFICULT AT ALL
SUM OF ALL RESPONSES TO PHQ QUESTIONS 1-9: 16
SUM OF ALL RESPONSES TO PHQ QUESTIONS 1-9: 16

## 2023-12-08 NOTE — PROGRESS NOTES
"  {PROVIDER CHARTING PREFERENCE:716127}    Subjective   Rajeev is a 68 year old, presenting for the following health issues:  Speech Problem (Patient suspects he had a stroke in June of 2023.  Slurring words, shooting pains in tongue)  {(!) Visit Details have not yet been documented.  Please enter Visit Details and then use this list to pull in documentation. (Optional):847719}    HPI     Concern - Speech Problems  Onset: X 6 months  Description: Notes slurring words, slow speech, and \"shooting tongue pain\" since June 2023.  Patient suspects he had a stroke over the summer.    Intensity: severe  Progression of Symptoms:  worsening  Accompanying Signs & Symptoms: tongue pain  Previous history of similar problem: none  Precipitating factors:        Worsened by: evening time speech worsens, more fatigue  Alleviating factors:        Improved by: none  Therapies tried and outcome: None        Review of Systems   {ROS COMP (Optional):340317}      Objective    Wt 79.4 kg (175 lb)   BMI 26.61 kg/m    Body mass index is 26.61 kg/m .  Physical Exam   {Exam List (Optional):312524}    {Diagnostic Test Results (Optional):471235}    {AMBULATORY ATTESTATION (Optional):692257}              "

## 2023-12-08 NOTE — PROGRESS NOTES
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 than directed on the label. If you do, your symptoms may get worse. You may also take pills that contain pseudoephedrine. Don t use products that combine multiple medicines. This is because side effects may be increased. Read labels. You can also ask the pharmacist for help. (People with high blood pressure should not use decongestants. They can raise blood pressure.)  Over-the-counter antihistamines may help if allergies contributed to your sinusitis.    Do not use nasal rinses or irrigation during an acute sinus infection, unless your healthcare provider tells you to. Rinsing may spread the infection to other areas in your sinuses.  Use acetaminophen or ibuprofen to control pain, unless another pain medicine was prescribed to you. If you have chronic liver or kidney disease or ever had a stomach ulcer, talk with your healthcare provider before using these medicines. (Aspirin should never be taken by anyone under age 18 who is ill with a fever. It may cause severe liver damage.)  Don't smoke. This can make symptoms worse.  Follow-up care  Follow up with your healthcare provider or our staff if you are better in 1 week.  When to seek medical advice  Call your healthcare provider if any of these occur:  Facial pain or  headache that gets worse  Stiff neck  Unusual drowsiness or confusion  Swelling of your forehead or eyelids  Vision problems, such as blurred or double vision  Fever of 100.4 F (38 C) or higher, or as directed by your healthcare provider  Seizure  Breathing problems  Symptoms don't go away in 10 days  Prevention  Here are steps you can take to help prevent an infection:  Keep good hand washing habits.  Don t have close contact with people who have sore throats, colds, or other upper respiratory infections.  Don t smoke, and stay away from secondhand smoke.  Stay up to date with of your vaccines.
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
## 2023-12-08 NOTE — PATIENT INSTRUCTIONS
If facial pain/ear pain do not improve within 3 days, follow up.   Begin Augmentin for sinus infection, Cortisporin eardrops for left ear infection, and Valtrex for herpes infection of the mouth/chin.   Follow up with neurology.     Keep ears completely dry during the time that patient is being treated. No swimming. Showering is ok, but keep ears away from the water as much as possible.   2.  Follow-up if no improvement in pain over the course the next 3 to 4 days.  Follow-up sooner if worsening symptoms such as fever develop.  3.  May give Tylenol or ibuprofen as needed for pain control.    External otitis (also called  swimmer s ear ) is an infection in the ear canal. It is often caused by bacteria or fungus. It can occur a few days after water gets trapped in the ear canal (from swimming or bathing). It can also occur after cleaning too deeply in the ear canal with a cotton swab or other object. Sometimes, hair care products get into the ear canal and cause this problem.  Symptoms can include pain, fever, itching, redness, drainage, or swelling of the ear canal. Temporary hearing loss may also occur.  Home care  Do not try to clean the ear canal. This can push pus and bacteria deeper into the canal.  Use prescribed ear drops as directed. These help reduce swelling and fight the infection. If an ear wick was placed in the ear canal, apply drops right onto the end of the wick. The wick will draw the medicine into the ear canal even if it is swollen closed.  A cotton ball may be loosely placed in the outer ear to absorb any drainage.  You may use acetaminophen or ibuprofen to control pain, unless another medicine was prescribed. Note: If you have chronic liver or kidney disease or ever had a stomach ulcer or GI bleeding, talk to your healthcare provider before taking any of these medicines.  Do not allow water to get into your ear when bathing. Also, don't swim until the infection has cleared.  Prevention  Keep your  ears dry. This helps lower the risk of infection. Dry your ears with a towel or hair dryer after getting wet. Also, use ear plugs when swimming.  Do not stick any objects in the ear to remove wax.  If you feel water trapped in your ear, use ear drops right away. You can get these drops over the counter at most drugstores. They work by removing water from the ear canal.  Follow-up care  Follow up with your healthcare provider in 1 week, or as advised.  When to seek medical advice  Call your healthcare provider right away if any of these occur:  Ear pain becomes worse or doesn t improve after 3 days of treatment  Redness or swelling of the outer ear occurs or gets worse  Headache  Painful or stiff neck  Drowsiness or confusion  Fever of 100.4 F (38 C) or higher, or as directed by your healthcare provider  Seizure       1.  Take antibiotic according to instructions, with food to prevent stomach upset. If you are prone to stomach upset with antibiotics, I recommend adding a probiotic to this regimen.  Culturelle is a trusted brand.  2.  I recommend using Mucinex to help thin mucus secretions.  Adding a nasal steroid spray such as Flonase can also be helpful with clearing sinus congestion.  3.  Take Tylenol 650mg every 4 hours or ibuprofen 600mg every 6 hours by mouth for pain/fever.  Do not exceed 4000mg of acetaminophen or 2400mg of ibuprofen from any source in a 24 hour period.  Taking Tylenol and ibuprofen together may be helpful in reducing pain.   4.  Follow-up if you not having any improvement in your symptoms over the next 5 days.    Sinusitis  The sinuses are air-filled spaces within the bones of the face. They connect to the inside of the nose. Sinusitis is an inflammation of the tissue that lines the sinuses. Sinusitis can occur during a cold. It can also happen due to allergies to pollens and other particles in the air. Sinusitis can cause symptoms of sinus congestion and a feeling of fullness. A sinus  infection causes fever, headache, and facial pain. There is often green or yellow fluid draining from the nose or into the back of the throat (post-nasal drip). You have been given antibiotics to treat this condition.  Home care  Take the full course of antibiotics as instructed. Do not stop taking them, even when you feel better.  Drink plenty of water, hot tea, and other liquids. This may help thin nasal mucus. It also may help your sinuses drain fluids.  Heat may help soothe painful areas of your face. Use a towel soaked in hot water. Or,  the shower and direct the warm spray onto your face. Using a vaporizer along with a menthol rub at night may also help soothe symptoms.   An expectorant with guaifenesin may help thin nasal mucus and help your sinuses drain fluids.  You can use an over-the-counter decongestant, unless a similar medicine was prescribed to you. Nasal sprays work the fastest. Use one that contains phenylephrine or oxymetazoline. First blow your nose gently. Then use the spray. Do not use these medicines more often than directed on the label. If you do, your symptoms may get worse. You may also take pills that contain pseudoephedrine. Don t use products that combine multiple medicines. This is because side effects may be increased. Read labels. You can also ask the pharmacist for help. (People with high blood pressure should not use decongestants. They can raise blood pressure.)  Over-the-counter antihistamines may help if allergies contributed to your sinusitis.    Do not use nasal rinses or irrigation during an acute sinus infection, unless your healthcare provider tells you to. Rinsing may spread the infection to other areas in your sinuses.  Use acetaminophen or ibuprofen to control pain, unless another pain medicine was prescribed to you. If you have chronic liver or kidney disease or ever had a stomach ulcer, talk with your healthcare provider before using these medicines. (Aspirin  should never be taken by anyone under age 18 who is ill with a fever. It may cause severe liver damage.)  Don't smoke. This can make symptoms worse.  Follow-up care  Follow up with your healthcare provider or our staff if you are better in 1 week.  When to seek medical advice  Call your healthcare provider if any of these occur:  Facial pain or headache that gets worse  Stiff neck  Unusual drowsiness or confusion  Swelling of your forehead or eyelids  Vision problems, such as blurred or double vision  Fever of 100.4 F (38 C) or higher, or as directed by your healthcare provider  Seizure  Breathing problems  Symptoms don't go away in 10 days  Prevention  Here are steps you can take to help prevent an infection:  Keep good hand washing habits.  Don t have close contact with people who have sore throats, colds, or other upper respiratory infections.  Don t smoke, and stay away from secondhand smoke.  Stay up to date with of your vaccines.

## 2023-12-08 NOTE — NURSING NOTE
"Chief Complaint   Patient presents with    Speech Problem     Pt has had trouble with speaking and moving his tongue last 2 months and also has white bumps on tongue and lips and left ear..     initial BP (!) 156/78   Pulse 70   Temp 98.6  F (37  C) (Oral)   Resp 16   Ht 1.727 m (5' 8\")   Wt 79.4 kg (175 lb)   SpO2 98%   BMI 26.61 kg/m   Estimated body mass index is 26.61 kg/m  as calculated from the following:    Height as of this encounter: 1.727 m (5' 8\").    Weight as of this encounter: 79.4 kg (175 lb)..  bp completed using cuff size large  CHIP BALDERAS LPN  "

## 2023-12-11 DIAGNOSIS — R17 ELEVATED BILIRUBIN: ICD-10-CM

## 2023-12-11 DIAGNOSIS — R79.89 ELEVATED SERUM CREATININE: ICD-10-CM

## 2023-12-11 DIAGNOSIS — D69.6 PLATELETS DECREASED (H): Primary | ICD-10-CM

## 2023-12-14 DIAGNOSIS — B96.89 ACUTE BACTERIAL SINUSITIS: ICD-10-CM

## 2023-12-14 DIAGNOSIS — J01.90 ACUTE BACTERIAL SINUSITIS: ICD-10-CM

## 2023-12-15 NOTE — TELEPHONE ENCOUNTER
Patient calling stating he was seen in [facility] on 12/8/23 and was prescribed Augmentin for an ear and sinus infection. Stated he is feeling better, but is still having sinus pain in lower jaw/ear area.  Patient took the last dose of antibiotics today.     Pain was a 10/10 initially and is down to a 3-4/10.  Patient has improved daily, but still has a lot of mucous still.  Patient feels like he still needs more medication.  Stated he is getting better, but is not feeling 100%      Please advise if patient needs another appointment?   Virtual or in person    Please call patient with response

## 2023-12-15 NOTE — TELEPHONE ENCOUNTER
Called and left message that prescription was sent.    Thank you,  Johnathan Robertson, Triage RN [facility]ocan[facility]  3:00 PM 12/15/2023

## 2023-12-18 ENCOUNTER — TELEPHONE (OUTPATIENT)
Dept: INTERNAL MEDICINE | Facility: CLINIC | Age: 68
End: 2023-12-18
Payer: MEDICARE

## 2023-12-18 DIAGNOSIS — R47.89 ALTERATION IN SPEECH: Primary | ICD-10-CM

## 2023-12-18 NOTE — TELEPHONE ENCOUNTER
Patient saw Hanane on 12/8/2023. Will refer to her.    Thank you,  Johnathan Robertson, Triage RN [facility]  2:11 PM 12/18/2023

## 2023-12-18 NOTE — TELEPHONE ENCOUNTER
Order/Referral Request    Who is requesting: Patient    Orders being requested: ENT referral    Reason service is needed/diagnosis: Patient believes he needs to see an ENT because he is having a hard time with his speech. He would also like to tested for Lyme's disease and Valley Fever.    When are orders needed by: ASAP    Has this been discussed with Provider: Yes    Does patient have a preference on a Group/Provider/Facility? [facility]    Does patient have an appointment scheduled?: No    Where to send orders: Place orders within Epic    Could we send this information to you in [facility] or would you prefer to receive a phone call?:   Patient would prefer a phone call   Okay to leave a detailed message?: Yes at Cell number on file:    Telephone Information:   Mobile [phone]

## 2023-12-18 NOTE — TELEPHONE ENCOUNTER
Called patient and left voice mail. Will patient need an appt  prior to the lab test orders that he is requesting ? Is ENT the correct dept for his speech concerns after possible stroke for which he was seen 12-8-2023      Patient was asked to return the clinic call and the number was provided.     Patient call back number [phone]

## 2023-12-19 NOTE — TELEPHONE ENCOUNTER
I can order labs  He was given referral to speech and neurology for his symptoms    He can have an ENT referral but not sure that is right next step   Referral done

## 2023-12-19 NOTE — TELEPHONE ENCOUNTER
Patient informed of recommendations from Hanane. Patient states he received call from [facility], but soonest open appointment was in July. He is concerned about waiting this long for evaluation. Message sent to provider to review if patient can be referred to another clinic for sooner ENT appointment.    Leonora Lopez RN  [facility]

## 2023-12-20 ENCOUNTER — LAB (OUTPATIENT)
Dept: LAB | Facility: CLINIC | Age: 68
End: 2023-12-20
Payer: MEDICARE

## 2023-12-20 ENCOUNTER — HOSPITAL ENCOUNTER (OUTPATIENT)
Facility: CLINIC | Age: 68
End: 2023-12-20
Attending: INTERNAL MEDICINE | Admitting: INTERNAL MEDICINE
Payer: MEDICARE

## 2023-12-20 ENCOUNTER — TELEPHONE (OUTPATIENT)
Dept: GASTROENTEROLOGY | Facility: CLINIC | Age: 68
End: 2023-12-20

## 2023-12-20 DIAGNOSIS — D69.6 PLATELETS DECREASED (H): ICD-10-CM

## 2023-12-20 DIAGNOSIS — R47.89 ALTERATION IN SPEECH: ICD-10-CM

## 2023-12-20 DIAGNOSIS — R17 ELEVATED BILIRUBIN: ICD-10-CM

## 2023-12-20 DIAGNOSIS — R79.89 ELEVATED SERUM CREATININE: ICD-10-CM

## 2023-12-20 LAB
ALBUMIN SERPL BCG-MCNC: 4.5 G/DL (ref 3.5–5.2)
ALP SERPL-CCNC: 76 U/L (ref 40–150)
ALT SERPL W P-5'-P-CCNC: 23 U/L (ref 0–70)
ANION GAP SERPL CALCULATED.3IONS-SCNC: 9 MMOL/L (ref 7–15)
AST SERPL W P-5'-P-CCNC: 21 U/L (ref 0–45)
B BURGDOR IGG+IGM SER QL: 0.06
BASOPHILS # BLD AUTO: 0 10E3/UL (ref 0–0.2)
BASOPHILS NFR BLD AUTO: 0 %
BILIRUB SERPL-MCNC: 1.4 MG/DL
BUN SERPL-MCNC: 16.1 MG/DL (ref 8–23)
CALCIUM SERPL-MCNC: 9.9 MG/DL (ref 8.8–10.2)
CHLORIDE SERPL-SCNC: 105 MMOL/L (ref 98–107)
CREAT SERPL-MCNC: 1.04 MG/DL (ref 0.67–1.17)
DEPRECATED HCO3 PLAS-SCNC: 28 MMOL/L (ref 22–29)
EGFRCR SERPLBLD CKD-EPI 2021: 79 ML/MIN/1.73M2
EOSINOPHIL # BLD AUTO: 0.5 10E3/UL (ref 0–0.7)
EOSINOPHIL NFR BLD AUTO: 7 %
ERYTHROCYTE [DISTWIDTH] IN BLOOD BY AUTOMATED COUNT: 12.2 % (ref 10–15)
GLUCOSE SERPL-MCNC: 106 MG/DL (ref 70–99)
HCT VFR BLD AUTO: 42.9 % (ref 40–53)
HGB BLD-MCNC: 14.5 G/DL (ref 13.3–17.7)
IMM GRANULOCYTES # BLD: 0 10E3/UL
IMM GRANULOCYTES NFR BLD: 0 %
LYMPHOCYTES # BLD AUTO: 2.8 10E3/UL (ref 0.8–5.3)
LYMPHOCYTES NFR BLD AUTO: 39 %
MCH RBC QN AUTO: 30.9 PG (ref 26.5–33)
MCHC RBC AUTO-ENTMCNC: 33.8 G/DL (ref 31.5–36.5)
MCV RBC AUTO: 92 FL (ref 78–100)
MONOCYTES # BLD AUTO: 0.6 10E3/UL (ref 0–1.3)
MONOCYTES NFR BLD AUTO: 9 %
NEUTROPHILS # BLD AUTO: 3.3 10E3/UL (ref 1.6–8.3)
NEUTROPHILS NFR BLD AUTO: 45 %
PLATELET # BLD AUTO: 175 10E3/UL (ref 150–450)
POTASSIUM SERPL-SCNC: 4.5 MMOL/L (ref 3.4–5.3)
PROT SERPL-MCNC: 7.2 G/DL (ref 6.4–8.3)
RBC # BLD AUTO: 4.69 10E6/UL (ref 4.4–5.9)
SODIUM SERPL-SCNC: 142 MMOL/L (ref 135–145)
WBC # BLD AUTO: 7.2 10E3/UL (ref 4–11)

## 2023-12-20 PROCEDURE — 99000 SPECIMEN HANDLING OFFICE-LAB: CPT

## 2023-12-20 PROCEDURE — 86618 LYME DISEASE ANTIBODY: CPT

## 2023-12-20 PROCEDURE — 36415 COLL VENOUS BLD VENIPUNCTURE: CPT

## 2023-12-20 PROCEDURE — 80053 COMPREHEN METABOLIC PANEL: CPT

## 2023-12-20 PROCEDURE — 86635 COCCIDIOIDES ANTIBODY: CPT | Mod: 90

## 2023-12-20 PROCEDURE — 85025 COMPLETE CBC W/AUTO DIFF WBC: CPT

## 2023-12-20 NOTE — TELEPHONE ENCOUNTER
"Endoscopy Scheduling Screen    Have you had a positive Covid test in the last 14 days?  No    Are you active on MyChart?   No    What insurance is in the chart?  Other:  MEDICARE    Ordering/Referring Provider:     TESS ONBLE      (If ordering provider performs procedure, schedule with ordering provider unless otherwise instructed. )    BMI: Estimated body mass index is 26.61 kg/m  as calculated from the following:    Height as of 12/8/23: 1.727 m (5' 8\").    Weight as of 12/8/23: 79.4 kg (175 lb).     Sedation Ordered  moderate sedation.   If patient BMI > 50 do not schedule in [facility].    If patient BMI > 45 do not schedule at [facility].    Are you taking methadone or Suboxone?  No    Are you taking any prescription medications for pain 3 or more times per week?   NO - No RN review required.    Do you have a history of malignant hyperthermia or adverse reaction to anesthesia?  No    (Females) Are you currently pregnant?        Have you been diagnosed or told you have pulmonary hypertension?   No    Do you have an LVAD?  No    Have you been told you have moderate to severe sleep apnea?  No    Have you been told you have COPD, asthma, or any other lung disease?  No    Do you have any heart conditions?  No     Have you ever had an organ transplant?   No    Have you ever had or are you awaiting a heart or lung transplant?   No    Have you had a stroke or transient ischemic attack (TIA aka \"mini stroke\" in the last 6 months?   No    Have you been diagnosed with or been told you have cirrhosis of the liver?   No    Are you currently on dialysis?   No    Do you need assistance transferring?   No    BMI: Estimated body mass index is 26.61 kg/m  as calculated from the following:    Height as of 12/8/23: 1.727 m (5' 8\").    Weight as of 12/8/23: 79.4 kg (175 lb).     Is patients BMI > 40 and scheduling location [facility]?  No    Do you take an injectable medication for weight loss or diabetes (excluding insulin)?  No    Do you " take the medication Naltrexone?  No    Do you take blood thinners?  No       Prep   Are you currently on dialysis or do you have chronic kidney disease?  No    Do you have a diagnosis of diabetes?  No    Do you have a diagnosis of cystic fibrosis (CF)?  No    On a regular basis do you go 3 -5 days between bowel movements?  No    BMI > 40?  No    Preferred Pharmacy:    [id number] [place], [place] - [address] [facility] [address] [address] [facility] [address]  Phone: [phone] Fax: [phone]      Final Scheduling Details   Colonoscopy prep sent?  Standard MiraLAX    Procedure scheduled  Colonoscopy    Surgeon:  IOANA     Date of procedure:  4/8     Pre-OP / PAC:   No - Not required for this site.    Location  [facility] - Per order.    Sedation   Moderate Sedation - Per order.      Patient Reminders:   You will receive a call from a Nurse to review instructions and health history.  This assessment must be completed prior to your procedure.  Failure to complete the Nurse assessment may result in the procedure being cancelled.      On the day of your procedure, please designate an adult(s) who can drive you home stay with you for the next 24 hours. The medicines used in the exam will make you sleepy. You will not be able to drive.      You cannot take public transportation, ride share services, or non-medical taxi service without a responsible caregiver.  Medical transport services are allowed with the requirement that a responsible caregiver will receive you at your destination.  We require that drivers and caregivers are confirmed prior to your procedure.

## 2023-12-21 ENCOUNTER — TELEPHONE (OUTPATIENT)
Dept: INTERNAL MEDICINE | Facility: CLINIC | Age: 68
End: 2023-12-21
Payer: MEDICARE

## 2023-12-21 NOTE — TELEPHONE ENCOUNTER
If truly losing ability to speak should go to ER for evaluation   Lab test are within reasonable ranges

## 2023-12-21 NOTE — TELEPHONE ENCOUNTER
Valley fever testing is in process   Although if he had it in past year could be positive even without disease being present    If his speech is progressively getting worse ER visit is where they can fully assesses him

## 2023-12-21 NOTE — TELEPHONE ENCOUNTER
"Patient informed of provider's message below.    His tongue \"hasn't worked right since mid summer\" and speech is progressively getting worse since July 2023.  Has a future appointment with neuro 1/10/24.    He reports he had \"Valley fever\" last winter in [place].  States people who live in [place] can get this - \"its an air borne fungal infection\".  He is asking if he can be tested for this. And asking to have any necessary labs that can cause loss of speech.    Please advise, thanks.    Ok to leave a detailed message on voice mail.  "

## 2023-12-21 NOTE — TELEPHONE ENCOUNTER
"Patient called, asking for the lab results yesterday. Patient states that he is \"losing ability to speak\" (as evident during the call), and is concerned what this could be from. Patient is breathing normal except for when he is trying to speak as he is struggling with every sentence. Patient states \"I feel relatively good otherwise.\"     Please advise, thank you.   "

## 2023-12-22 ENCOUNTER — TELEPHONE (OUTPATIENT)
Dept: INTERNAL MEDICINE | Facility: CLINIC | Age: 68
End: 2023-12-22
Payer: MEDICARE

## 2023-12-22 NOTE — TELEPHONE ENCOUNTER
It is doubtful the speech issues would be related to mold- usually mold causes more breathing and upper respiratory symptoms. Seeing neurology as planned I think is best next step

## 2023-12-22 NOTE — TELEPHONE ENCOUNTER
Patient states he feels like he may know a reason for losing his speech/dysphagia for two or so months. Patient was seen by Hanane Nolasco for this on 12/08/2023    Patient reports that he was exposed heavily to mold during summer of 2023 when he was living in his home. Patient reports that mold was found all around his home, under the carpet and in the walls. 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    Patient would like provider input on this and how to proceed with potentially seeing how he could be worked up for mold exposure if possible.     Patient declined going to emergency room because he doesn't trust medical personnel.     Please advise.     Thank you,  Johnathan Robertson, Triage RN [place] [facility]  3:27 PM 12/22/2023

## 2023-12-23 LAB — COCCIDIOIDES AB TITR SER CF: NORMAL {TITER}

## 2023-12-25 ENCOUNTER — APPOINTMENT (OUTPATIENT)
Dept: CT IMAGING | Facility: CLINIC | Age: 68
End: 2023-12-25
Attending: EMERGENCY MEDICINE
Payer: MEDICARE

## 2023-12-25 ENCOUNTER — HOSPITAL ENCOUNTER (EMERGENCY)
Facility: CLINIC | Age: 68
Discharge: HOME | End: 2023-12-25
Attending: EMERGENCY MEDICINE | Admitting: EMERGENCY MEDICINE
Payer: MEDICARE

## 2023-12-25 VITALS
RESPIRATION RATE: 14 BRPM | HEART RATE: 64 BPM | TEMPERATURE: 97.1 F | DIASTOLIC BLOOD PRESSURE: 82 MMHG | OXYGEN SATURATION: 97 % | SYSTOLIC BLOOD PRESSURE: 158 MMHG

## 2023-12-25 DIAGNOSIS — R06.2 WHEEZING: ICD-10-CM

## 2023-12-25 DIAGNOSIS — R09.A2 GLOBUS SENSATION: ICD-10-CM

## 2023-12-25 DIAGNOSIS — R06.02 SHORTNESS OF BREATH: ICD-10-CM

## 2023-12-25 LAB
ANION GAP SERPL CALCULATED.3IONS-SCNC: 8 MMOL/L (ref 7–15)
BASOPHILS # BLD AUTO: 0.1 10E3/UL (ref 0–0.2)
BASOPHILS NFR BLD AUTO: 1 %
BUN SERPL-MCNC: 14.1 MG/DL (ref 8–23)
CALCIUM SERPL-MCNC: 9 MG/DL (ref 8.8–10.2)
CHLORIDE SERPL-SCNC: 105 MMOL/L (ref 98–107)
CREAT SERPL-MCNC: 0.88 MG/DL (ref 0.67–1.17)
DEPRECATED HCO3 PLAS-SCNC: 25 MMOL/L (ref 22–29)
EGFRCR SERPLBLD CKD-EPI 2021: >90 ML/MIN/1.73M2
EOSINOPHIL # BLD AUTO: 0.3 10E3/UL (ref 0–0.7)
EOSINOPHIL NFR BLD AUTO: 4 %
ERYTHROCYTE [DISTWIDTH] IN BLOOD BY AUTOMATED COUNT: 12.5 % (ref 10–15)
GLUCOSE SERPL-MCNC: 113 MG/DL (ref 70–99)
HCT VFR BLD AUTO: 40.8 % (ref 40–53)
HGB BLD-MCNC: 13.9 G/DL (ref 13.3–17.7)
HOLD SPECIMEN: NORMAL
HOLD SPECIMEN: NORMAL
IMM GRANULOCYTES # BLD: 0 10E3/UL
IMM GRANULOCYTES NFR BLD: 1 %
LYMPHOCYTES # BLD AUTO: 1.1 10E3/UL (ref 0.8–5.3)
LYMPHOCYTES NFR BLD AUTO: 17 %
MCH RBC QN AUTO: 30.2 PG (ref 26.5–33)
MCHC RBC AUTO-ENTMCNC: 34.1 G/DL (ref 31.5–36.5)
MCV RBC AUTO: 89 FL (ref 78–100)
MONOCYTES # BLD AUTO: 0.7 10E3/UL (ref 0–1.3)
MONOCYTES NFR BLD AUTO: 11 %
NEUTROPHILS # BLD AUTO: 4.1 10E3/UL (ref 1.6–8.3)
NEUTROPHILS NFR BLD AUTO: 66 %
NRBC # BLD AUTO: 0 10E3/UL
NRBC BLD AUTO-RTO: 0 /100
PLATELET # BLD AUTO: 159 10E3/UL (ref 150–450)
POTASSIUM SERPL-SCNC: 4.4 MMOL/L (ref 3.4–5.3)
RBC # BLD AUTO: 4.6 10E6/UL (ref 4.4–5.9)
SODIUM SERPL-SCNC: 138 MMOL/L (ref 135–145)
WBC # BLD AUTO: 6.1 10E3/UL (ref 4–11)

## 2023-12-25 PROCEDURE — 93005 ELECTROCARDIOGRAM TRACING: CPT | Mod: RTG

## 2023-12-25 PROCEDURE — 250N000009 HC RX 250

## 2023-12-25 PROCEDURE — 94664 DEMO&/EVAL PT USE INHALER: CPT | Mod: 59

## 2023-12-25 PROCEDURE — 250N000012 HC RX MED GY IP 250 OP 636 PS 637

## 2023-12-25 PROCEDURE — 250N000011 HC RX IP 250 OP 636: Performed by: EMERGENCY MEDICINE

## 2023-12-25 PROCEDURE — 99285 EMERGENCY DEPT VISIT HI MDM: CPT | Mod: 25

## 2023-12-25 PROCEDURE — 250N000009 HC RX 250: Performed by: EMERGENCY MEDICINE

## 2023-12-25 PROCEDURE — 999N000157 HC STATISTIC RCP TIME EA 10 MIN

## 2023-12-25 PROCEDURE — 36415 COLL VENOUS BLD VENIPUNCTURE: CPT | Performed by: EMERGENCY MEDICINE

## 2023-12-25 PROCEDURE — 80048 BASIC METABOLIC PNL TOTAL CA: CPT | Performed by: EMERGENCY MEDICINE

## 2023-12-25 PROCEDURE — 94640 AIRWAY INHALATION TREATMENT: CPT

## 2023-12-25 PROCEDURE — 85025 COMPLETE CBC W/AUTO DIFF WBC: CPT | Performed by: EMERGENCY MEDICINE

## 2023-12-25 PROCEDURE — G1010 CDSM STANSON: HCPCS

## 2023-12-25 RX ORDER — IOPAMIDOL 755 MG/ML
500 INJECTION, SOLUTION INTRAVASCULAR ONCE
Status: COMPLETED | OUTPATIENT
Start: 2023-12-25 | End: 2023-12-25

## 2023-12-25 RX ORDER — ALBUTEROL SULFATE 0.83 MG/ML
2.5 SOLUTION RESPIRATORY (INHALATION) EVERY 6 HOURS PRN
Qty: 90 ML | Refills: 0 | Status: SHIPPED | OUTPATIENT
Start: 2023-12-25

## 2023-12-25 RX ORDER — ALBUTEROL SULFATE 90 UG/1
2 AEROSOL, METERED RESPIRATORY (INHALATION) EVERY 6 HOURS PRN
Qty: 18 G | Refills: 0 | Status: SHIPPED | OUTPATIENT
Start: 2023-12-25 | End: 2024-02-27

## 2023-12-25 RX ORDER — PREDNISONE 20 MG/1
TABLET ORAL
Qty: 8 TABLET | Refills: 0 | Status: SHIPPED | OUTPATIENT
Start: 2023-12-26

## 2023-12-25 RX ORDER — PREDNISONE 20 MG/1
60 TABLET ORAL ONCE
Status: COMPLETED | OUTPATIENT
Start: 2023-12-25 | End: 2023-12-25

## 2023-12-25 RX ORDER — IPRATROPIUM BROMIDE AND ALBUTEROL SULFATE 2.5; .5 MG/3ML; MG/3ML
6 SOLUTION RESPIRATORY (INHALATION) ONCE
Status: COMPLETED | OUTPATIENT
Start: 2023-12-25 | End: 2023-12-25

## 2023-12-25 RX ADMIN — SODIUM CHLORIDE 65 ML: 9 INJECTION, SOLUTION INTRAVENOUS at 10:12

## 2023-12-25 RX ADMIN — IOPAMIDOL 90 ML: 755 INJECTION, SOLUTION INTRAVENOUS at 10:12

## 2023-12-25 RX ADMIN — PREDNISONE 60 MG: 20 TABLET ORAL at 08:58

## 2023-12-25 RX ADMIN — IPRATROPIUM BROMIDE AND ALBUTEROL SULFATE 6 ML: .5; 3 SOLUTION RESPIRATORY (INHALATION) at 08:59

## 2023-12-25 ASSESSMENT — ACTIVITIES OF DAILY LIVING (ADL)
ADLS_ACUITY_SCORE: 35
ADLS_ACUITY_SCORE: 35

## 2023-12-25 NOTE — ED PROVIDER NOTES
ED ATTENDING PHYSICIAN NOTE:   I evaluated this patient in conjunction with Emily Kay PA-C  I have participated in the care of the patient and personally performed key elements of the history, exam, and medical decision making.      HPI:   Rajeev Montalvo is a 68 year old male who presents with throat pressure and difficulty swallowing beginning around 1-2 hours ago. He notes that since May he has had increased difficulty swallowing and talking; he has only had trouble breathing today. He states it is worse when laying down, and he has had trouble sleeping. Endorses a productive cough. No throat pain, difficulty swallowing, or chest pain. He denies any fever, neck pain, or shoulder pain. He has not had a diagnosis of sleep apnea. He denies history of asthma or COPD; he does note he smoked marijuana up until 2010. He had an MRI of the brain with MR angiogram that was negative for acute findings on December 8. He has not seen ENT.  He has neurology follow-up next month.    Review of External Notes:   Clinic note reviewed from December 8, 2023 when the patient was noted to have dysphagia.     EXAM:   General: No distress  Cardiac: Regular rate and rhythm, no murmur  Lungs: Breathing comfortably, scattered wheezes  HEENT: TMs are clear.  No trismus.  No drooling or stridor.  Lips and tongue are normal.  No tenderness to anterior neck.  No elevation of the floor the mouth.  No submandibular induration.  No evidence of peritonsillar or retropharyngeal abscess.  No edema.  Airway is widely patent.    ECG  ECG results from 12/25/23   EKG 12-lead, tracing only     Value    Systolic Blood Pressure     Diastolic Blood Pressure     Ventricular Rate 59    Atrial Rate 59    WA Interval 136    QRS Duration 92        QTc 411    P Axis 58    R AXIS 27    T Axis 60    Interpretation ECG      Sinus bradycardia  Nonspecific T wave abnormality  Abnormal ECG  No previous ECGs available       Labs  Labs Ordered and Resulted  from Time of ED Arrival to Time of ED Departure   BASIC METABOLIC PANEL - Abnormal       Result Value    Sodium 138      Potassium 4.4      Chloride 105      Carbon Dioxide (CO2) 25      Anion Gap 8      Urea Nitrogen 14.1      Creatinine 0.88      GFR Estimate >90      Calcium 9.0      Glucose 113 (*)    CBC WITH PLATELETS AND DIFFERENTIAL    WBC Count 6.1      RBC Count 4.60      Hemoglobin 13.9      Hematocrit 40.8      MCV 89      MCH 30.2      MCHC 34.1      RDW 12.5      Platelet Count 159      % Neutrophils 66      % Lymphocytes 17      % Monocytes 11      % Eosinophils 4      % Basophils 1      % Immature Granulocytes 1      NRBCs per 100 WBC 0      Absolute Neutrophils 4.1      Absolute Lymphocytes 1.1      Absolute Monocytes 0.7      Absolute Eosinophils 0.3      Absolute Basophils 0.1      Absolute Immature Granulocytes 0.0      Absolute NRBCs 0.0       Imaging  Soft tissue neck CT w contrast   Final Result   IMPRESSION:    1.  No definite concerning neck mass or acute inflammatory process/fluid collection identified along the mucosal spaces of the upper aerodigestive tract.   2.  A few mildly prominent left upper cervical lymph nodes, as described, which may be reactive.         MEDICAL DECISION MAKING/ASSESSMENT AND PLAN:   This patient presents with a globus sensation as well as likely COPD exacerbation.  He feels that his symptoms have markedly improved with steroids and neb.  CT scan of the neck without mass lesion.  He was given ENT follow-up.     DIAGNOSIS:     ICD-10-CM    1. Globus sensation  R09.A2       2. Shortness of breath  R06.02 Compressor Nebulizer     Respiratory therapy to instruct      3. Wheezing  R06.2 Compressor Nebulizer     Respiratory therapy to instruct          DISPOSITION:   The patient was discharged home.     Scribe Disclosure:  Dustin BRITT Hired, am serving as a scribe at 8:44 AM on 12/25/2023 to document services personally performed by Tomi Hobson MD based on my  observations and the provider's statements to me.   12/25/2023  [facility] EMERGENCY DEPT     Tomi Hobson MD  12/25/23 2215

## 2023-12-25 NOTE — ED TRIAGE NOTES
Patient states that when he tries to lay down he is unable to breath, and that he feels as if he is suffocating.  Patient refused swab in triage.      Triage Assessment (Adult)       Row Name 12/25/23 0728          Triage Assessment    Airway WDL WDL        Respiratory WDL    Respiratory WDL WDL        Skin Circulation/Temperature WDL    Skin Circulation/Temperature WDL WDL        Cardiac WDL    Cardiac WDL WDL        Peripheral/Neurovascular WDL    Peripheral Neurovascular WDL WDL        Cognitive/Neuro/Behavioral WDL    Cognitive/Neuro/Behavioral WDL WDL

## 2023-12-25 NOTE — DISCHARGE INSTRUCTIONS
You were seen today for difficulty breathing.  Workup today was reassuring.  CT scan of the neck does not show any mass or other concerning findings.  We tried a nebulizer and steroids which helped significantly with your symptoms.  I recommend the following:    Nebulizer every 6 hours as needed for shortness of breath  Albuterol inhaler 2 puffs every 6 hours as needed for shortness of breath, wheezing, cough  Prednisone (steroid) 2 tablets for 4 days, starting tomorrow.  You were given your first dose of steroids today.  Follow-up with your primary care provider in 1 week for recheck  Call ENT to discuss the sensation you have in your throat.  Their phone number is attached.  Certainly return for chest pains, inability to swallow or breathe.

## 2023-12-25 NOTE — ED PROVIDER NOTES
History     Chief Complaint:  Shortness of Breath       HPI   Rajeev Montalvo is a 68 year old male past medical history of GERD and hypertension presenting today for evaluation of difficulty breathing this morning.  Reports that he woke up at 6 AM feeling like he cannot breathe through his throat.  He was afraid to go back to sleep and fear that he would suffocate.  He denies any pain associated with this including no chest pain, neck pain, sore throat.  Does outline that his voice has been becoming more hoarse and slurred over the last 8 months.  He reports that he recently had an MRI for this (see below for results).  Reports that he historically smokes marijuana but quit in 2010 and denies any tobacco use.  He denies any fevers, cough, chest pain, contacts.    Independent Historian:   None - Patient Only    Review of External Notes:   I reviewed the patient's office visit from 12/8/2023.  They noted dysarthria at that time which the patient has been having for many months.  He had an MRI of the brain and MRA of the brain and neck which returned showing no mass, hemorrhage, stroke.  Neck MRI was normal for age.    I also reviewed the patient's urgent care note from 10/28/2023.  At that time he had an episode that sounds similar to today in which he felt like he was suffocating while laying down.  He was reporting difficulty with his tongue and voice at that time.    Medications:    Amlodipine  Valacyclovir     Past Medical History:    GERD  Hiatal hernia  Hypertension  Shaken baby syndrome     Past Surgical History:    Skin cyst removal  Left knee repair  T&A     Physical Exam   Patient Vitals for the past 24 hrs:   BP Temp Temp src Pulse Resp SpO2   12/25/23 1119 -- -- -- -- -- 97 %   12/25/23 1118 -- -- -- 64 -- 96 %   12/25/23 1117 -- -- -- 66 -- 95 %   12/25/23 1116 -- -- -- 61 -- 95 %   12/25/23 1115 (!) 158/82 -- -- 63 -- 97 %   12/25/23 1113 -- -- -- 66 -- 96 %   12/25/23 1112 -- -- -- 61 -- 97 %    12/25/23 1111 -- -- -- 66 -- 96 %   12/25/23 1045 (!) 153/83 -- -- 65 -- 96 %   12/25/23 1000 (!) 148/79 -- -- 65 14 95 %   12/25/23 0930 (!) 171/78 -- -- 67 -- 96 %   12/25/23 0915 (!) 178/97 -- -- 72 10 99 %   12/25/23 0845 (!) 161/77 -- -- 66 10 98 %   12/25/23 0830 (!) 164/86 -- -- 63 (!) 9 97 %   12/25/23 0815 (!) 164/89 -- -- 60 14 98 %   12/25/23 0800 (!) 169/89 -- -- -- -- --   12/25/23 0729 (!) 186/93 97.1  F (36.2  C) Oral 60 18 99 %        Physical Exam  BP (!) 158/82   Pulse 64   Temp 97.1  F (36.2  C) (Oral)   Resp 14   SpO2 97%    General: Older middle-aged male.  Examined in room ED11.  Accompanied by significant other.  Head: Atraumatic. Normocephalic.  Neck: No masses palpated.  Full range of motion.  No meningismus.  EENT: PERRL. EOMI. Moist mucus membranes.  Oropharynx is clear without any erythema, edema, exudate.  Uvula is midline.  CV: Regular rate and rhythm. No appreciable murmurs, rubs, or gallops.   Respiratory: Breathing comfortably on room air. Lungs clear to auscultation bilaterally without wheezes, rhonchi, or rales.  GI: Soft, non-distended. Non-tender abdomen. No rebound, rigidity, or guarding.   Msk: Extremities without tenderness to palpation or deformity.  No calf swelling tenderness to palpation.  2+ DP pulses bilaterally.  Skin: Warm and dry. No rashes.  Neuro: Awake, alert, and conversant. No focal neurologic deficits.   Psych: Appropriate mood and affect.    Emergency Department Course   ECG  ECG results from 12/25/23   EKG 12-lead, tracing only     Value    Systolic Blood Pressure     Diastolic Blood Pressure     Ventricular Rate 59    Atrial Rate 59    VT Interval 136    QRS Duration 92        QTc 411    P Axis 58    R AXIS 27    T Axis 60    Interpretation ECG      Sinus bradycardia  Nonspecific T wave abnormality  Abnormal ECG  No previous ECGs available       Imaging:  Soft tissue neck CT w contrast   Final Result   IMPRESSION:    1.  No definite concerning  neck mass or acute inflammatory process/fluid collection identified along the mucosal spaces of the upper aerodigestive tract.   2.  A few mildly prominent left upper cervical lymph nodes, as described, which may be reactive.         Laboratory:  Labs Ordered and Resulted from Time of ED Arrival to Time of ED Departure   BASIC METABOLIC PANEL - Abnormal       Result Value    Sodium 138      Potassium 4.4      Chloride 105      Carbon Dioxide (CO2) 25      Anion Gap 8      Urea Nitrogen 14.1      Creatinine 0.88      GFR Estimate >90      Calcium 9.0      Glucose 113 (*)    CBC WITH PLATELETS AND DIFFERENTIAL    WBC Count 6.1      RBC Count 4.60      Hemoglobin 13.9      Hematocrit 40.8      MCV 89      MCH 30.2      MCHC 34.1      RDW 12.5      Platelet Count 159      % Neutrophils 66      % Lymphocytes 17      % Monocytes 11      % Eosinophils 4      % Basophils 1      % Immature Granulocytes 1      NRBCs per 100 WBC 0      Absolute Neutrophils 4.1      Absolute Lymphocytes 1.1      Absolute Monocytes 0.7      Absolute Eosinophils 0.3      Absolute Basophils 0.1      Absolute Immature Granulocytes 0.0      Absolute NRBCs 0.0        Emergency Department Course & Assessments:  Interventions:  Medications   ipratropium - albuterol 0.5 mg/2.5 mg/3 mL (DUONEB) neb solution 6 mL (6 mLs Nebulization $Given 12/25/23 0859)   predniSONE (DELTASONE) tablet 60 mg (60 mg Oral $Given 12/25/23 0858)   iopamidol (ISOVUE-370) solution 500 mL (90 mLs Intravenous $Given 12/25/23 1012)   CT scan flush (65 mLs Intravenous $Given 12/25/23 1012)      Assessments:  0820 I entered the patient's room and obtained history.    Independent Interpretation (X-rays, CTs, rhythm strip):  None    Consultations/Discussion of Management or Tests:  Patient seen in conjunction with attending physician Dr. Hobson.   ED Course as of 12/25/23 1331   Mon Dec 25, 2023   1114 Pt feels much improved following nebulizer treatment here.     Social  Determinants of Health affecting care:   None    Disposition:  The patient was discharged to home.     Impression & Plan    Medical Decision Making:  This is a 68-year-old male presenting as above.  On arrival vital signs are notable for elevated blood pressure but otherwise stable.  He is oxygenating appropriately on room air and appears to be tolerating his own secretions.  On exam, he does have dysarthria and slightly slurred slurred speech, though reports this has been going on for months and had an MRI/MRA earlier this month.  This does not sound to be an acute issue.  Differential included angioedema, strep pharyngitis, mass, abscess, vocal cord dysfunction, viral syndrome.  We obtained a CT scan of the neck to look for any mass or abscess which was thankfully negative.    On exam, he also does have diffuse wheezing in all lung fields.  He is a former marijuana smoker and although the relationship between this and wheezing is unclear at this time, I elected to treat as if it is an asthma or COPD exacerbation with DuoNeb and steroids.  He felt significantly improved following this and requested these medications for home which I feel is reasonable.  He does not have any distinct chest complaints and feels that his shortness of breath is more related to the throat but it is the lung fields.    At this time, the exact etiology of the patient's globus sensation and suffocating feeling is unclear.  I feel he requires further workup by ENT and I provided the phone number for him to reach out to somebody.  In the meantime, I recommended patient try nebulizer therapy at home and certainly return here if his shortness of breath is worse or if he is unable to swallow.  Strict return precautions were discussed and the patient was discharged home in stable condition.    Diagnosis:    ICD-10-CM    1. Globus sensation  R09.A2       2. Shortness of breath  R06.02 Compressor Nebulizer     Respiratory therapy to instruct       3. Wheezing  R06.2 Compressor Nebulizer     Respiratory therapy to instruct         Discharge Medications:  Discharge Medication List as of 12/25/2023 11:29 AM        START taking these medications    Details   albuterol (PROAIR HFA/PROVENTIL HFA/VENTOLIN HFA) 108 (90 Base) MCG/ACT inhaler Inhale 2 puffs into the lungs every 6 hours as needed for shortness of breath, wheezing or cough, Disp-18 g, R-0, E-PrescribePharmacy may dispense brand covered by insurance (Proair, or proventil or ventolin or generic albuterol inhaler)      albuterol (PROVENTIL) (2.5 MG/3ML) 0.083% neb solution Take 1 vial (2.5 mg) by nebulization every 6 hours as needed for shortness of breath, wheezing or cough, Disp-90 mL, R-0, E-Prescribe      predniSONE (DELTASONE) 20 MG tablet Take two tablets (= 40mg) each day for 4 (four) days, Disp-8 tablet, R-0, E-Prescribe                   Emily Kay PA-C  12/25/23 6912

## 2023-12-25 NOTE — PROGRESS NOTES
Respiratory Therapy Note    Patient/Guardian instructed on use of home nebulizer machine. Learner demonstrated understanding and received a signed copy of purchase agreement. Facesheet printed and attached to facility copy.    RT Ricky  12:01 PM December 25, 2023

## 2023-12-26 LAB
ATRIAL RATE - MUSE: 59 BPM
DIASTOLIC BLOOD PRESSURE - MUSE: NORMAL MMHG
INTERPRETATION ECG - MUSE: NORMAL
P AXIS - MUSE: 58 DEGREES
PR INTERVAL - MUSE: 136 MS
QRS DURATION - MUSE: 92 MS
QT - MUSE: 416 MS
QTC - MUSE: 411 MS
R AXIS - MUSE: 27 DEGREES
SYSTOLIC BLOOD PRESSURE - MUSE: NORMAL MMHG
T AXIS - MUSE: 60 DEGREES
VENTRICULAR RATE- MUSE: 59 BPM

## 2023-12-26 NOTE — TELEPHONE ENCOUNTER
Patient informed of message from Hanane.     Patient was seen in ER yesterday for breathing concerns, feeling better after starting steroids. He states that his tongue feels a little stronger today compared to how it felt recently. He says he has future Neurology appointment for Jan 8 or 10.     Leonora Lopez RN  [facility]

## 2024-01-10 ENCOUNTER — LAB (OUTPATIENT)
Dept: LAB | Facility: CLINIC | Age: 69
End: 2024-01-10
Payer: MEDICARE

## 2024-01-10 DIAGNOSIS — G70.00 MYASTHENIA GRAVIS WITHOUT EXACERBATION (H): Primary | ICD-10-CM

## 2024-01-10 LAB
ALBUMIN SERPL BCG-MCNC: 4.2 G/DL (ref 3.5–5.2)
ALP SERPL-CCNC: 85 U/L (ref 40–150)
ALT SERPL W P-5'-P-CCNC: 20 U/L (ref 0–70)
AST SERPL W P-5'-P-CCNC: 18 U/L (ref 0–45)
BILIRUB DIRECT SERPL-MCNC: 0.22 MG/DL (ref 0–0.3)
BILIRUB SERPL-MCNC: 1.1 MG/DL
CK SERPL-CCNC: 45 U/L (ref 39–308)
ERYTHROCYTE [SEDIMENTATION RATE] IN BLOOD BY WESTERGREN METHOD: 28 MM/HR (ref 0–20)
LDH SERPL L TO P-CCNC: 147 U/L (ref 0–250)
PROT SERPL-MCNC: 7.2 G/DL (ref 6.4–8.3)

## 2024-01-10 PROCEDURE — 86618 LYME DISEASE ANTIBODY: CPT

## 2024-01-10 PROCEDURE — 83516 IMMUNOASSAY NONANTIBODY: CPT

## 2024-01-10 PROCEDURE — 80076 HEPATIC FUNCTION PANEL: CPT

## 2024-01-10 PROCEDURE — 86038 ANTINUCLEAR ANTIBODIES: CPT

## 2024-01-10 PROCEDURE — 82550 ASSAY OF CK (CPK): CPT

## 2024-01-10 PROCEDURE — 36415 COLL VENOUS BLD VENIPUNCTURE: CPT

## 2024-01-10 PROCEDURE — 82085 ASSAY OF ALDOLASE: CPT

## 2024-01-10 PROCEDURE — 83519 RIA NONANTIBODY: CPT

## 2024-01-10 PROCEDURE — 83615 LACTATE (LD) (LDH) ENZYME: CPT

## 2024-01-10 PROCEDURE — 85652 RBC SED RATE AUTOMATED: CPT

## 2024-01-11 LAB
ALDOLASE SERPL-CCNC: 3 U/L
ANA SER QL IF: NEGATIVE
B BURGDOR IGG+IGM SER QL: 0.07

## 2024-01-12 LAB
ACHR BIND AB SER-SCNC: 0 NMOL/L
ACHR BLOCK AB/ACHR TOTAL SFR SER: 12 %

## 2024-01-13 LAB — ACHR MOD AB/ACHR TOTAL SFR SER: 1 %

## 2024-01-31 ENCOUNTER — TRANSFERRED RECORDS (OUTPATIENT)
Dept: HEALTH INFORMATION MANAGEMENT | Facility: CLINIC | Age: 69
End: 2024-01-31

## 2024-01-31 ENCOUNTER — MEDICAL CORRESPONDENCE (OUTPATIENT)
Dept: HEALTH INFORMATION MANAGEMENT | Facility: CLINIC | Age: 69
End: 2024-01-31

## 2024-02-16 DIAGNOSIS — B96.89 ACUTE BACTERIAL SINUSITIS: ICD-10-CM

## 2024-02-16 DIAGNOSIS — J01.90 ACUTE BACTERIAL SINUSITIS: ICD-10-CM

## 2024-02-16 NOTE — TELEPHONE ENCOUNTER
I am not sure I understand this  Maybe he needs to ask neurology if related to neurological condition

## 2024-02-16 NOTE — TELEPHONE ENCOUNTER
Patient says he is having tongue lesions. White spots on tongue upon waking today. He stated this script has helped in the past. Patient say this will be onging due to his ALS diagnoses however he says that CHILO Nolasco is not aware of his ALS diagnosis at this time.      Patient call back number [phone].

## 2024-02-20 NOTE — TELEPHONE ENCOUNTER
Called and left message to notify patient that prescription was sent.    Thank you,  Johnathan Robertson, Triage RN [facility]an[facility]  2:05 PM 2/20/2024

## 2024-02-20 NOTE — TELEPHONE ENCOUNTER
Call to patient who states that patient has severe sinus issues due to a pulled tooth in 2019. Patient says he has severe sinus infection. Patient states he has intense pressure in sinuses and on the side of his throat. Patient says his MRI back in December showed sinus issues.     Patient would like amoxicillin refill due to patient's suspected as a severe sinus infection. Patient would not like to come in to be seen again if possible. Patient states he has a hard time getting around and with appointment's due to speech delay/issues.     Patient currently in [place].     Please advise.     Thank you,  Johnathan Robertson, Triage RN [facility]  10:46 AM 2/20/2024

## 2024-02-27 DIAGNOSIS — R06.02 SOB (SHORTNESS OF BREATH): Primary | ICD-10-CM

## 2024-02-27 DIAGNOSIS — R06.2 WHEEZING: ICD-10-CM
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
## 2024-02-27 NOTE — TELEPHONE ENCOUNTER
Medication Question or Refill    Pt is in [place], he has lost his inhaler and doesn't have one at all. He would like to be able to have '2 inhalers' so that this doesn't happen again.    What medication are you calling about (include dose and sig)?: albuterol (PROAIR HFA/PROVENTIL HFA/VENTOLIN HFA) 108 (90 Base) MCG/ACT inhaler     Preferred Pharmacy:       [facility]  [place], [place] - [address] [address]  Phone: [phone] Fax: [phone]      Controlled Substance Agreement on file:   CSA -- Patient Level:    CSA: None found at the patient level.       Who prescribed the medication?:Emily Kay PA-C     Do you need a refill? Yes      Patient offered an appointment? No-in [place]    Do you have any questions or concerns?  Please see note above for refill of 2 inhalers instead of only 1.

## 2024-02-28 NOTE — TELEPHONE ENCOUNTER
Significant other (Hanane) calls to check status of inhaler refill. Relayed PCP's question, and she states that the patient uses the inhaler for ALS symptoms. The inhaler was previously prescribed by [facility] provider MONA Knowles and they were told to ask PCP for refills.

## 2024-02-29 RX ORDER — ALBUTEROL SULFATE 90 UG/1
2 AEROSOL, METERED RESPIRATORY (INHALATION) EVERY 6 HOURS PRN
Qty: 18 G | Refills: 4 | Status: SHIPPED | OUTPATIENT
Start: 2024-02-29 | End: 2024-02-29

## 2024-02-29 RX ORDER — ALBUTEROL SULFATE 90 UG/1
2 AEROSOL, METERED RESPIRATORY (INHALATION) EVERY 6 HOURS PRN
Qty: 54 G | Refills: 1 | Status: SHIPPED | OUTPATIENT
Start: 2024-02-29

## 2024-02-29 RX ORDER — ALBUTEROL SULFATE 90 UG/1
2 AEROSOL, METERED RESPIRATORY (INHALATION) EVERY 6 HOURS PRN
Qty: 54 G | Refills: 1 | Status: SHIPPED | OUTPATIENT
Start: 2024-02-29 | End: 2024-02-29

## 2024-02-29 NOTE — TELEPHONE ENCOUNTER
I do not see that diagnosis in chart either   I can sent for SOB  Myasthenia gravis is a diagnosis I see   Please fax as requested

## 2024-03-06 NOTE — TELEPHONE ENCOUNTER
Confirmed fax with Hanane, writer called in prescription for patient so [facility] to ensure patient can receive prescription. Updated Hanane.     Nessa OLIVAREZ

## 2024-03-06 NOTE — TELEPHONE ENCOUNTER
Hanane, on consent to communicate calls to ask about scrpt being faxed to [place]. It was faxed but the still have not gotten it. Hanane will call us back with a confirmed fax number. If the fax does not go thru again, can  RN call in script?     This message will stay in [place] front door pool until Hanane calls back

## 2024-03-20 ENCOUNTER — TELEPHONE (OUTPATIENT)
Dept: INTERNAL MEDICINE | Facility: CLINIC | Age: 69
End: 2024-03-20
Payer: MEDICARE

## 2024-03-20 DIAGNOSIS — Z71.89 COORDINATION OF COMPLEX CARE: Primary | ICD-10-CM

## 2024-03-20 NOTE — TELEPHONE ENCOUNTER
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     Spouse states they are currently in [place] staying with a friend and she doesn't know when they will be back to [place]. Spouse, Hanane is stating she needs support such as support groups and how to get help regarding resources such as how to care for him (she intends to be patient's caregiver).     Relayed to spouse, Hanane that appointment will likely be needed for patient with Hanane Nolasco to discuss this further. Spouse said she's open to telephone visit, but unable to do video appointment as she's not technology friendly and out of state, so unable to do in person appointment.     Advised spouse to contact Dr. Nava's office to see if she could get documents/advise since he is specialist and did patient's diagnosis. Provided spouse with Dr. Nava's clinic phone number.     Routing to Hanane Nolasco to see how to proceed.    Thank you,  Johnathan Robertson, Triage RN [place]  10:39 AM 3/20/2024

## 2024-03-20 NOTE — TELEPHONE ENCOUNTER
Called  patient's wife  (on consent to communicate) and informed her of provider recommendations. This writer also advised that if they are not returning to [place], they should get patient established with PCP and Neurology in [facility] to help with health needs. She states their intention is to come back to [place], but she has not discussed a time line for return with patient yet. She did reach out to Dr. Pandya's office and waiting for a return call. She asks who she reaches out to for supplies such as walker, that he may need at home. Advised her those types of supplies would come from [employer] store, but she would need provider order for this and then insurance usually requires documentation or forms completed to support the need for the equipment in order to cover this.    She asks if our office has any help they can provide her with support groups. Informed her we may not have specific support groups we can offer, but our Care Coordination team can help with other resources to help support patient and herself when they are back in [place]. She would be interested in speaking to them to discuss this further. Informed her Care Coordination may not be able to provide assistance until they are back in [place], Hanane verbalizes understanding of this and still wishes to speak to them. Placed Care Coordination referral.     Leonora Lopez RN  [facility]

## 2024-03-20 NOTE — TELEPHONE ENCOUNTER
We are not allowed to see people out state on visits    The neurology group would probably have more resources on this issue.  I saw him 1 time, and he did not have the diagnosis at the time I saw him.  I referred him to neurology.    I would have him call neurology to find out about resources.  They may even have resources that they can refer them to in [place], at the neurology clinic

## 2024-03-21 ENCOUNTER — PATIENT OUTREACH (OUTPATIENT)
Dept: CARE COORDINATION | Facility: CLINIC | Age: 69
End: 2024-03-21
Payer: MEDICARE

## 2024-03-21 NOTE — PROGRESS NOTES
Clinic Care Coordination Contact  [facility]/Voicemail    Clinical Data: Care Coordinator Outreach    Outreach Documentation Number of Outreach Attempt   3/21/2024  12:53 PM 1       Left message on  Hanane's ([facility])  voicemail with call back information and requested return call.    Plan: Care Coordinator will try to reach patient again in 1-2 business days.      Oriana MCLEOD Public Health  Community Health Worker  [facility] Clinics:  [place], [place] & [place]   Clinic Care Coordination  [phone]

## 2024-03-22 ENCOUNTER — PATIENT OUTREACH (OUTPATIENT)
Dept: CARE COORDINATION | Facility: CLINIC | Age: 69
End: 2024-03-22
Payer: MEDICARE

## 2024-03-22 NOTE — PROGRESS NOTES
Clinic Care Coordination Contact  [facility]/Voicemail    Clinical Data: Care Coordinator Outreach    Outreach Documentation Number of Outreach Attempt   3/21/2024  12:53 PM 1   3/22/2024  10:19 AM 2       Reason for Referral:  Patient/Caregiver Support  Resources for Support    Additional Information-  Please contact patient's spouse Hanane at [phone]      Note:  Patient and spouse are currently in [place], but his wife-Hanane, is wanting help with resources and support to be able to continue caring for him at home with recent diagnosis of Bulbar ALS (diagnosed by Neurology). She is aware you may not be able to provide assistance until they return to [place].       Left message on patient's voicemail with call back information and requested return call.    Plan: Care Coordinator will send care coordination introduction letter with care coordinator contact information and explanation of care coordination services via mail.     Care Coordinator will do no further outreaches at this time.    LELE Solano  Clinic Care Coordination  [facility]: [facility] [place], and [facility]  Phone: [phone]

## 2024-03-22 NOTE — LETTER
[facility] CARE COORDINATION  [address] [place] [address]    March 22, 2024    Rajeev Montalvo  [address] [address]      Dear Rajeev,    I am a clinic community health worker who works with [facility] with the [facility]. I have been trying to reach you recently to introduce Clinic Care Coordination. Below is a description of clinic care coordination and how I can further assist you.       The clinic care coordination team is made up of a registered nurse, , financial resource worker and community health worker who understand the health care system. The goal of clinic care coordination is to help you manage your health and improve access to the health care system. Our team works alongside your provider to assist you in determining your health and social needs. We can help you obtain health care and community resources, providing you with necessary information and education. We can work with you through any barriers and develop a care plan that helps coordinate and strengthen the communication between you and your care team.  Our services are voluntary and are offered without charge to you personally.    Please feel free to contact me with any questions or concerns regarding care coordination and what we can offer.      We are focused on providing you with the highest-quality healthcare experience possible.    Sincerely,     LELE Solano  Clinic Care Coordination  [facility]  Phone: [phone]

## 2024-03-23 ENCOUNTER — TELEPHONE (OUTPATIENT)
Dept: GASTROENTEROLOGY | Facility: CLINIC | Age: 69
End: 2024-03-23
Payer: MEDICARE

## 2024-03-23 NOTE — LETTER
March 23, 2024      Rajeev Montalvo  [address] [address]              Dear Rajeev,          Colonoscopy      Procedure date: 4/8/24    Anticipated arrival time: 12:55 PM   (Arrival times are subject to change)    Facility location: [facility]; [address] [place] [address] Check in location: Main entrance at . Come through the roundabout underneath the awning (not the ER entrance).      Important Procedure Reminders:     Prep Instructions:   Instructions on how to prepare for your upcoming procedure are found below. Please read instructions carefully. Deviation from instructions may result in less than desired outcomes and procedure may need to be rescheduled.   If you have additional questions regarding how to prepare for your upcoming procedure, contact our endoscopy pre assessment nurses at [phone] option 4 Monday through Friday 7:00am-5:00pm     Policy:   The medications used during the procedure will make you sleepy, so you won't be able to drive. On the day of your procedure, please have an adult ready to drive you home and stay with you for the next 6 hours.   You can't use public transportation, ride-share services, or non-medical taxi services without a responsible caregiver. Medical transport services are okay, but a caregiver must be there to receive you at your destination.   Make sure your  and caregiver are confirmed before your procedure.      Day of procedure:  Please keep in mind that arrival times may change. Let your  know there might be a one-hour window for changes.  We ask that you please check in at the  with your . Your  should remain on campus.  Expect to be at the procedure center for about 1.5-2.5 hours.    Please do not wear jewelry (i.e. earrings, rings, necklaces, watches, etc). Leave your purse, billfold, credit cards, and other valuables at home.   Bring insurance card and ID.     To  cancel or reschedule your procedure:   Within 14 days of your procedure if you develop any flu-like symptoms (such as fever, cough, shortness of breath), COVID-19 like symptoms or exposure to COVID-19, contact our endoscopy team at [phone] option 4 to determine if procedure can be completed or needs to be delayed.   If you need to cancel or reschedule, our endoscopy scheduling team can be reached at [phone], option 2. Monday through Friday, 7:00am-5:00pm.      Medication Reminders:    Please note the following medication holding recommendations:   N/A    ----------------------------------------------------------------------------------------------------------------------------------------------------------------------------------------------------------------------------------------------------------------------      Standard MiraLAX Bowel Prep  Prep Instructions for your Colonoscopy      Please read these instructions carefully at least 7 days prior to your colonoscopy procedure. Be sure to follow all directions completely. The inside of your colon must be clean to allow for a complete examination for the presence of any growths, polyps, and/or abnormalities, as well as their biopsy or removal. A number of tips are included in order to make this part of the procedure as comfortable as possible.    Getting ready:   A nurse will call you to go over instructions and your health history.  It's important to complete the nurse assessment before your procedure. If you don't, your procedure might have to be canceled.  You must arrange for an adult to drive you home after your exam. Your colonoscopy cannot be done unless you have a ride. If you need to use public transportation, someone must ride with you and stay with you for a minimum of 24 hours.  Check with your insurance company to be sure they will cover this exam.  Go to the drug store and buy:  Four (4) - Dulcolax (Bisacodyl) 5mg tablets   8.3 ounce bottle  of Miralax powder  64 ounces of Gatorade (not red or purple)     10 ounce bottle of clear magnesium citrate    7 days before the exam:  Talk to your prescribing provider: If you take blood thinners (such as Coumadin, Plavix, Xarelto, Eliquis, Lovenox, or others), these medications may need to be stopped temporarily before your procedure. Your prescribing provider will tell you what to do.   Talk to your prescribing provider: If you take prescription NSAIDS (such as Sulindac, Celebrex, Mobic, Relafen). Your prescribing provider will tell you what to do.   Stop taking fiber and iron supplements   Stop eating corn, popcorn, nuts and foods that contain seeds. These can stay in the colon for many days and they can clog up the colonoscope.     3 days before the exam:  Begin a low-fiber diet (see below).   Drink at least 4 to 6 large glasses of water or sports drink (not red or purple) each day.     One day before the exam:  Only clear liquid diet is allowed (see below). No solid food should be eaten.   Drink at least 8 to 10 full glasses of clear liquids during the day.   Stop taking NSAID pain relievers, such as Advil, Ibuprofen, Motrin, etc.  You may take Tylenol.  Note: You will start drinking the colonoscopy prep solution at 5 PM. The timing of second step depends on your appointment arrival time. See Steps 1-2 below.    Step One:  At 4 PM, take 2 Dulcolax (Bisacodyl) tablets.   At 4 PM, mix the entire bottle of Miralax with 64 ounces of Gatorade in a pitcher and stir to dissolve the powder. Chill if desired, but do not add ice.   At 5 PM, start drinking an 8-ounce glass of the Miralax and Gatorade mixture every 15 minutes until the pitcher is HALF empty (about 4 glasses).  Store the rest in the refrigerator.   Bowel movements usually begin about one hour after your finish this first dose of Miralax. The stool is likely to be brown at this stage.   After you start drinking the solution, stay near a toilet. You may  have watery stools (diarrhea), mild cramping, bloating , and nausea.   You may want to use Vaseline on the skin around your anus after each bowel movement to prevent irritation. You can also use wet wipes to prevent irritation.  Continue to drink clear liquids.     At 10 PM, take 2 Dulcolax (Bisacodyl) tablets  At 10 PM start drinking an 8-ounce glass of Miralax and Gatorade mixture every 15 minutes until the pitcher is empty (about 4 glasses).       Step Two:   If If you arrive for your procedure after 11 AM:     At 6 AM on the day of the exam: drink 10 ounces of clear Magnesium Citrate        Day of exam:  You may drink clear liquids only up until 2 hours before your arrival time.  You may take your necessary morning medications with sips of water  Do not take diabetes medicine by mouth until after your exam.  Do not smoke, chew tobacco, or swallow anything, including water or gum for at least 2 hours before your arrival time. This is a safety issue. Your procedure could be cancelled if you do not follow directions.  Please do not wear jewelry (i.e. earrings, rings, necklaces, watches, etc) . Leave your purse, billfold, credit cards, and other valuables at home.    Please arrive with a responsible adult who can take you home after the test and stay with you for a minimum of 24 hours: The medicine used will make you sleepy and forgetful. If you do not have someone to take you home, we will cancel your procedure. If using public transportation you must have someone to ride with you.  Please perform your nebulizer treatments and airway clearance therapy in the morning prior to the procedure (if applicable).  If you have asthma, bring your inhalers.       CLEAR LIQUID DIET   You may have:    Water, tea, coffee (no milk or cream)  Soda pop, Gatorade (not red or purple)  Jell-O, Popsicles (no milk or fruit pieces - not red or purple)  Fat-free soup broth or bouillon  Plain hard candy, such as clear life savers (not red  or purple)  Clear juices and fruit-flavored drinks, such as apple juice, white grape juice, Hi-C, and Nabeel-Aid (not red or purple)   Do not have:    Milk or milk products such as ice cream, malts or shakes, or coffee creamer  Red or purple drinks of any kind such as cranberry juice or grape juice. Avoid red or purple Jell-O, Popsicles, Nabeel-Aid, sorbet, sherbet and candy  Juices with pulp such as orange, grapefruit, pineapple or tomato juice  Cream soups of any kind  Alcohol and beer  Protein drinks or protein powder     LOW FIBER DIET   You may have:    Starches: White bread, rolls, biscuits, croissants, Maday toast, white flour tortillas, waffles, pancakes, South Korean toast; white rice, noodles, pasta, macaroni; cooked and peeled potatoes; plain crackers, saltines; cooked farina or cream of rice; puffed rice, corn flakes, Rice Krispies, Special K   Vegetables: tender cooked and canned, vegetable broths  Fruits and fruit juices: Strained fruit juice, canned fruit without seeds or skin (not pineapple), applesauce, pear sauce, ripe bananas, melons (not watermelon)   Milk products: Milk (plain or flavored), cheese, cottage cheese, yogurt (no berries), custard, ice cream    Proteins: Tender, well-cooked ground beef, lamb, veal, ham, pork, chicken, turkey, fish or organ meats; eggs; creamy peanut butter   Fats and condiments:  Margarine, butter, oils, mayonnaise, sour cream, salad dressing, plain gravy; spices, cooked herbs; sugar, clear jelly, honey, syrup   Snacks, sweets and drinks: Pretzels, hard candy; plain cakes and cookies (no nuts or seeds); gelatin, plain pudding, sherbet, Popsicles; coffee, tea, carbonated ( fizzy ) drinks Do not have:    Starches: Breads or rolls that contain nuts, seeds or fruit; whole wheat or whole grain breads that contain more than 1 gram of fiber per slice; cornbread; corn or whole wheat tortillas; potatoes with skin; brown rice, wild rice, kasha (buckwheat), and oatmeal   Vegetables: Any  raw or steamed vegetables; vegetables with seeds; corn in any form   Fruits and fruit juices: Prunes, prune juice, raisins and other dried fruits, berries and other fruits with seeds, canned pineapple juices with pulp such as orange, grapefruit, pineapple or tomato juice  Milk products: Any yogurt with nuts, seeds or berries   Proteins: Tough, fibrous meats with gristle; cooked dried beans, peas or lentils; crunchy peanut butter  Fats and condiments: Pickles, olives, relish, horseradish; jam, marmalade, preserves   Snacks, sweets and drinks: Popcorn, nuts, seeds, granola, coconut, candies made with nuts or seeds; all desserts that contain nuts, seeds, raisins and other dried fruits, coconut, whole grains or bran.      FAQ:    Why should Miralax be mixed with Gatorade or another clear sports drink?   It is important that your body does not develop an imbalance of electrolytes with the large volume of fluid in this prep. Gatorade/sports drinks contains those electrolytes.   Does Miralax have to be mixed with Gatorade?  Gatorade, Powerade, or any of the other sports drinks are acceptable. If you are diabetic, it is acceptable to use the low sugar options, such as Gatorade Zero, Powerade Zero, G2, etc.  Can I put ice in the Gatorade/Miralax mixture?  We prefer that you mix it and put it in the refrigerator to chill instead of using ice. The ice will melt and dilute the laxative.    Why should the Miralax prep be taken in several steps?   The stool is flushed out by a large wave of fluid going through the colon. Just sipping a large volume of the solution will not achieve the desired result. Studies have shown that two smaller waves (or more in some cases) are better than one large one.    Why are the second Miralax dose and Magnesium Citrate so close to the exam?   The intestine continues to produce mucus and waste. Longer intervals between the prep and the exam can lead to less than desired results. However, the  stomach must be empty at the time of the exam in order to allow safe sedation. Therefore, there should be nothing by mouth 2 hours before the exam is started.   How do you know if your colon is cleaned out?   After completing the bowel prep, your bowel movements should be all liquid and yellow. Your bowel movements will look similar to urine in the toilet. If there are pieces of stool (poop) in the toilet, or if you can't see to the bottom of the toilet, please call our office for advice. Call [phone] and ask to speak with a nurse.   Why do you need a responsible  to take you home and stay with you?  We require a responsible adult to take you home for your safety. The sedation medicines used to relax you during the procedure can impair your judgement and reaction time, and make you forgetful and possibly a little unsteady. Do not drive, make any important decisions, or sign any legal documents for 24 hours after your procedure.   It is normal to feel bloated and gassy after your procedure. Walking will help move the air through your colon. You can take non-aspirin pain relievers that contain acetaminophen (Tylenol).     When can you eat after your procedure?  You may resume your normal diet when you feel ready, unless advised otherwise by the doctor performing your procedure. Do not drink alcohol for 24 hours after your procedure.   You many resume normal activities (work, exercise, etc.) after 24 hours.     When will you get test results?  You should have your procedure results and any lab results (if applicable) by letter, MyChart message, or phone call within 2 weeks. If you have any questions, please call the doctor that referred you for the procedure.         Updated: 06/22/2022

## 2024-03-23 NOTE — TELEPHONE ENCOUNTER
Pre visit planning completed.      Procedure details:    Patient scheduled for Colonoscopy  on 4/8/24.     Arrival time: 1255. Procedure time 1340    Facility location: [facility]; [address] [place] [address]. Check in location: Main entrance at . Come through the roundabout underneath the awning (not the ER entrance).    Sedation type: Conscious sedation - discuss sedation. Hx of PTSD.     Pre op exam needed? N/A    Indication for procedure: screening       Chart review:     Electronic implanted devices? No    Recent diagnosis of diverticulitis within the last 6 weeks? No    Diabetic? No      Medication review:    Anticoagulants? No    NSAIDS? No    Other medication HOLDING recommendations:  N/A      Prep for procedure:     Bowel prep recommendation: Standard Miralax  Due to: standard bowel prep.    Prep instructions sent via letter         Abeba Cueva RN  Endoscopy Procedure Pre Assessment RN  [phone] option 4

## 2024-03-25 ENCOUNTER — TELEPHONE (OUTPATIENT)
Dept: OTOLARYNGOLOGY | Facility: CLINIC | Age: 69
End: 2024-03-25
Payer: MEDICARE

## 2024-03-25 NOTE — TELEPHONE ENCOUNTER
[facility]    Phone Message    May a detailed message be left on voicemail: yes     Reason for Call: Other: per patient significant other patient was seen in ER 3 times because of breathing/windpipe issues with throat. Wondering if someone can reach out to them so that they can talk with the care team to see if anything/ any one else is available do to the severity.      Action Taken: Other: ENT    Travel Screening: Not Applicable

## 2024-03-25 NOTE — TELEPHONE ENCOUNTER
Attempted to contact patient in order to complete pre assessment questions.     No answer. Left message to return call to [phone] option 4    Callback required communication sent via [employer].    Discuss sedation (see below)     Abeba Cueva RN  Endoscopy Procedure Pre Assessment RN

## 2024-03-26 NOTE — TELEPHONE ENCOUNTER
Called patient to review symptoms patient is experiencing, per referral and referring provider's note patient need speech therapy, but patient is stating he is having trouble swallowing, per referral note patient was suppose to see neurology regarding the difficulty swallowing. Asked that patient call back to assess symptoms. Jackie King RN on 3/26/2024 at 10:18 AM

## 2024-03-26 NOTE — PROGRESS NOTES
Clinic Care Coordination Contact  [facility]/Voicemail  Writer received a VM from [facility] on 3/25/2024 requesting a return call. Writer reactivated Patient program.    Clinical Data: Care Coordinator Outreach    Outreach Documentation Number of Outreach Attempt   3/21/2024  12:53 PM 1   3/22/2024  10:19 AM 2   3/26/2024   3:59 PM 3       Left message on Hanane voicemail with call back information and requested return call.    Plan: Care Coordinator will try to reach patient again in 1-2 business days.    Oriana MCLEOD Public Health  Community Health Worker  [facility]:  [place], [place] & [place]   Clinic Care Coordination  [phone]

## 2024-03-26 NOTE — TELEPHONE ENCOUNTER
"Gregoria Mejia!    I just spoke to this patient. I tried explaining to him that we need to get him rescheduled to see someone on the voice team, and he said that he is currently admitted in urgent care and he \"won't live long enough to see that appointment\" next available with Dysphonia is September. He also said he is \"choking,\" I'm not really sure what to say or do. Thoughts?    Thanks,  Britney  "

## 2024-03-27 NOTE — PROGRESS NOTES
Clinic Care Coordination Contact  [facility]/Voicemail    Clinical Data: Care Coordinator Outreach    Outreach Documentation Number of Outreach Attempt   3/21/2024  12:53 PM 1   3/22/2024  10:19 AM 2   3/26/2024   3:59 PM 3   3/27/2024   9:07 AM 4       Left message on  Hanane's  voicemail with call back information and requested return call.    Plan: Care Coordinator will do no further outreaches at this time.      Oriana MCLEOD Public Health  Community Health Worker  [facility]:  [facility] Care Coordination  [phone]

## 2024-03-28 NOTE — TELEPHONE ENCOUNTER
Second call attempt to complete pre assessment.     No answer.  Left message to return call to [phone] #4 by next business day prior to 4PM or procedure will be sent to cancel.         Chante Banda RN  Endoscopy Procedure Pre Assessment RN

## 2024-03-29 ENCOUNTER — PATIENT OUTREACH (OUTPATIENT)
Dept: CARE COORDINATION | Facility: CLINIC | Age: 69
End: 2024-03-29
Payer: MEDICARE

## 2024-03-29 NOTE — PROGRESS NOTES
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 She is aware you may not be able to provide assistance until they return to [place].            Order Questions    Question Answer   Reason for Referral: Patient/Caregiver Support   Patient/Caregiver Suport: Resources for Support   Clinical Staff have discussed the Care Coordination Referral with the patient and/or caregiver: Yes   Additional Information: Please contact patient's spouse Hanane at [phone]       Patient accepts CC: Yes. Patient scheduled for assessment with CC RN on 4/2/2024 at 10:00 AM. Patient noted desire to discuss in-home services/community supports.     CHW was able to connect with Hanane (CTC: [phone]) and introduce self/care coordination and intent of call. Hanane shares that they do not know when exactly they are going to be moving back to [place], however Patient does have an appointment with Dr. Nava on 4/10/2024. Hanane would like to meet with CC RN to discuss services/support for the Patient.   Hanane would like the Patient to get connected with PCP to get his care going again.  provided her the [facility] phone number. Hanane is requesting that someone reaches out to her to get this scheduled. Writer to route this request to the Care Team.  Unfortunately, there was some connection issues during the call.      attempted to call back, however [facility].  left a VM further confirming the following phone call appointment details that was  able to be scheduled/discussed over the phone.  4/2/2024 10:00 [facility]     No further CC questions or concerns identified during the time of call.     Oriana MCLEOD Public Health  Community Health Worker  [facility] Clinics:  [place], [place] [facility] Care Coordination  [phone]

## 2024-04-01 ENCOUNTER — TELEPHONE (OUTPATIENT)
Dept: GASTROENTEROLOGY | Facility: CLINIC | Age: 69
End: 2024-04-01
Payer: MEDICARE

## 2024-04-01 NOTE — TELEPHONE ENCOUNTER
No return call received.   Pre assessment was not completed for upcoming scheduled procedure.     Staff message sent to endoscopy scheduling to cancel procedure per policy.       Chante Banda, RN   Endoscopy Procedure Pre Assessment RN

## 2024-04-01 NOTE — TELEPHONE ENCOUNTER
Caller: Writer to patient     Reason for Reschedule/Cancellation   (please be detailed, any staff messages or encounters to note?): Pre assessment call not returned       Prior to reschedule please review:  Ordering Provider:     Hanane Nolasco APRN CNP in [place] [facility]     Sedation Determined: moderate  Does patient have any ASC Exclusions, please identify?: n      Notes on Cancelled Procedure:  Procedure: Lower Endoscopy [Colonoscopy]   Date: 04/08/2024  Location: [facility]; [address] [place] [address]  Surgeon: Sourav      Rescheduled: No, CASE IN DEPOT       Did you cancel or rescheduled an EUS procedure? No.

## 2024-04-01 NOTE — PROGRESS NOTES
Writer received an incoming call from Hanane who left previous VM today and would like to reschedule her assessment with CC RN. Writer assisted in rescheduling this phone call appointment.     No additional questions or concerns during the time of call.     Oriana MCLEOD Public Health  Community Health Worker  [facility]:  [facility] & [facility] Care Coordination  [phone]

## 2024-04-08 ENCOUNTER — PATIENT OUTREACH (OUTPATIENT)
Dept: NURSING | Facility: CLINIC | Age: 69
End: 2024-04-08
Payer: MEDICARE

## 2024-04-08 DIAGNOSIS — R47.82 FLUENCY DISORDER ASSOCIATED WITH UNDERLYING DISEASE: Primary | ICD-10-CM

## 2024-04-08 ASSESSMENT — ACTIVITIES OF DAILY LIVING (ADL): DEPENDENT_IADLS:: INDEPENDENT

## 2024-04-08 NOTE — LETTER
[facility]  Patient Centered Plan of Care  About Me:        Patient Name:  Rajeev Montalvo    YOB: 1955  Age:         68 year old   [place] MRN:    [id number] Telephone Information:  Home Phone [phone]   Mobile [phone]       Address:  [address] [place] [id number] Email address:  [email or web address]      Emergency Contact(s)    Name Relationship Lgl Grd Work Phone Home Phone Mobile Phone   1. HANANE SANCHEZ Significant ot*   [phone]    2. CIRSTIN WHITNEY Friend   [phone]   3. VICKIE CATHERINE Friend   [phone]           Primary language:  English     needed? No   [place] [facility]:  [phone] op. 1  Other communication barriers:Physical impairment (Bulbar ALS)    Preferred Method of Communication:  Mail  Current living arrangement: I live in a private home with spouse    Mobility Status/ Medical Equipment: Independent    Health Maintenance  Health Maintenance Reviewed: Due/Overdue (Discussed with patient.)    My Access Plan  Medical Emergency 911   Primary Clinic Line [facility] - [phone]   24 Hour Appointment Line [phone] or  [phone]) (toll-free)   24 Hour Nurse Line [phone] (toll-free)   Preferred Urgent Care No data recorded   Preferred Hospital [facility]  [phone]     Preferred Pharmacy [id number] - Closed - [place], [place] - [address] [facility].     Behavioral Health Crisis Line The National Suicide Prevention Lifeline at 1-493.958.2758 or Text/Call 378     My Care Team Members  Patient Care Team         Relationship Specialty Notifications Start End    Hanane Nolasco APRN CNP PCP - General Internal Medicine  4/8/24     Phone: [phone] Fax: [phone]         [address] [facility] [address]    Radha Telles, RN Lead Care Coordinator Primary Care - CC Admissions 3/29/24     Phone:  [phone]         Oriana Fulton CHW Community Health Worker  Admissions 4/8/24     Phone: [phone]                   My Care Plans  Self Management and Treatment Plan    Care Plan  Care Plan: Advance Care Plan       Problem: Patient does not have a valid Health Care Directive       Goal: Complete Health Care Directive       Start Date: 4/8/2024 Expected End Date: 7/8/2024    This Visit's Progress: 50%    Note:     Barriers: Early bulbar ALS; Limited income; Provider availability - wait time to complete appointments.   Strengths: Motivated; Agreeable to Care Coordination.   Patient expressed understanding of goal: Yes.   Action steps to achieve this goal:  1. I will review the resources on Honoring Choices.   2. I will submit my completed Advance Care Planning documents for authentication to [employer].   3. I will contact my care team with questions, concerns or support needs. I will use the clinic as a resource and I understand I can contact my clinic with 24/7 after hours services available. Care Coordinator will remain available as needed.                               Care Plan: Financial Resource Worker       Problem: Financial Resource Worker       Goal: Financial Resource Worker       Start Date: 4/8/2024 Expected End Date: 10/7/2024    This Visit's Progress: 0%    Note:     Barriers: Early bulbar ALS; Limited income; Provider availability - wait time to complete appointments.   Strengths: Motivated; Agreeable to Care Coordination.   Patient expressed understanding of goal: Yes.   Action steps to achieve this goal:  1. I will respond to the Financial Resource Worker (FRW).    2. I will return calls from the FRW promptly in order to complete assessments timely.    3. I will contact my care team with questions, concerns or support needs. I will use the clinic as a resource and I understand I can contact my clinic with 24/7 after hours services available. Care Coordinator will remain  available as needed.                            Action Plans on File:     Advance Care Plans/Directives:   Advanced Care Plan/Directives on file:   No    Discussed with patient/caregiver(s):   Declined Further Information         My Medical and Care Information  Problem List   Patient Active Problem List   Diagnosis    HTN (hypertension)    PTSD (post-traumatic stress disorder)    HSV (herpes simplex virus) infection    Hiatal hernia    Gastroesophageal reflux disease with esophagitis    Speech abnormality      Current Medications and Allergies:    Current Outpatient Medications   Medication Sig Dispense Refill    albuterol (PROAIR HFA/PROVENTIL HFA/VENTOLIN HFA) 108 (90 Base) MCG/ACT inhaler Inhale 2 puffs into the lungs every 6 hours as needed for shortness of breath, wheezing or cough 54 g 1    albuterol (PROVENTIL) (2.5 MG/3ML) 0.083% neb solution Take 1 vial (2.5 mg) by nebulization every 6 hours as needed for shortness of breath, wheezing or cough 90 mL 0    amLODIPine (NORVASC) 10 MG tablet TAKE 1 TABLET BY MOUTH DAILY. (Patient not taking: Reported on 12/8/2023) 90 tablet 3    amoxicillin-clavulanate (AUGMENTIN) 875-125 MG tablet Take 1 tablet by mouth 2 times daily 20 tablet 0    neomycin-polymyxin-hydrocortisone (CORTISPORIN) 3.5-53966-0 otic suspension Apply 3 drops to affected ear(s) 4 times daily for 10 days. Lie with affected ear upward x 5 min 10 mL 0    predniSONE (DELTASONE) 20 MG tablet Take two tablets (= 40mg) each day for 4 (four) days 8 tablet 0    valACYclovir (VALTREX) 1000 mg tablet Take 1 tablet (1,000 mg) by mouth 3 times daily for 7 days 21 tablet 0     Current Facility-Administered Medications   Medication Dose Route Frequency Provider Last Rate Last Admin    sodium chloride (PF) 0.9% PF flush 3 mL  3 mL Intracatheter q1 min prn Arianna Post PA-C   3 mL at 12/08/23 1032      Allergies   Allergen Reactions    Amlodipine      Nauseated and headache     Hydrochlorothiazide       Nausea and headache             Care Coordination Start Date: 3/29/2024   Frequency of Care Coordination: monthly, more frequently as needed     Form Last Updated: 04/08/2024

## 2024-04-08 NOTE — COMMUNITY RESOURCES LIST (ENGLISH)
April 8, 2024           YOUR PERSONALIZED LIST OF SERVICES & PROGRAMS           & SHELTER    Case Management      Living - Housing Stabilization Services  [address] [place] [address] (Distance: 24.2 miles)  Phone: [phone]  Website: https://www.[employer]  Language: Telugu, English, Libyan  Fee: Insurance, Self pay      [employer] [employer] - Rental Homes for Future Homebuyers Program  [address] [place] [address] (Distance: 14.7 miles)  Phone: [phone]  Language: English  Fee: Free  Accessibility: Translation services      [employer] - Housing Stabilization Services  Phone: [phone]) 619-7043  Website: https://homebasemn.com/  Language: English  Hours: Mon 8:00 AM - 4:00 PM Tue 8:00 AM - 4:00 PM Wed 8:00 AM - 4:00 PM Thu 8:00 AM - 4:00 PM Fri 8:00 AM - 4:00 PM  Fee: Free  Accessibility: Blind accommodation, Deaf or hard of hearing  Transportation Options: Free transportation    Payment Assistance      [employer] [place] - Elder Independent Living Program  [address] [place] [address] (Distance: 18.7 miles)  Website: https://www.[facility].info/elders  Language: English      [employer] - [employer] the Key  Phone: (560) 385-3480  Website: https://www.fzq27-lbgzpxxvlhhgya.org/programs.html  Language: English  Hours: Mon 7:00 AM - 7:00 PM Tue 7:00 AM - 7:00 PM Wed 7:00 AM - 7:00 PM Thu 7:00 AM - 7:00 PM Fri 7:00 AM - 7:00 PM  Fee: Free      - [employer] Patient Assistance Program  Phone: (988) 588-8149  Website: https://sumanbbeconnect.org/community-engagement/[employer]-patient-assistance-program/  Language: English  Hours: Mon 8:00 AM - 5:00 PM Tue 8:00 AM - 5:00 PM Wed 8:00 AM - 5:00 PM Thu 8:00 AM - 5:00 PM Fri 8:00 AM - 5:00 PM  Fee: Free    Mediation & Eviction Prevention      Living - Housing Stabilization Services  [address] [place] [address] (Distance: 24.2 miles)  Phone: (893)  131-7485  Website: https://www.[employer]  Language: Czech, English, Bruneian  Fee: Insurance, Self pay      [facility] - Elder Independent Living Program  [address] [place] [address] (Distance: 18.7 miles)  Website: https://www.amn.info/elders  Language: English      Line - Tenant Rights / Eviction Prevention  Website: https://Montreallinemn.org/k-vqld-ud-/  Language: English, Sammarinese               IMPORTANT NUMBERS & WEBSITES        Emergency Services  911  .   United Way  211 http://211unitedway.org  .   Poison Control  (451) 327-2202 http://mnpoison.org http://wisconsinpoison.org  .     Suicide and Crisis Lifeline  988 http://988Tipbitline.org  .   Childhelp [place] Child Abuse Hotline  467.486.9541 http://Childhelphotline.org   .   [place] Sexual Assault Hotline  (974) 283-4414 (HOPE) http://Copious.org   .     National Runaway Safeline  (611) 693-1405 (RUNAWAY) http://Fabkids.37coins  .   Pregnancy & Postpartum Support  Call/text 859-337-5722  [place]: http://ppsupportmn.org  [place]: http://Profind.com/wi  .   Substance Abuse National Helpline ([facility])  014-233-HELP (7422) http://Findtreatment.gov   .                DISCLAIMER: These resources have been generated via the [employer] Platform. [employer] does not endorse any service providers mentioned in this resource list. [employer] does not guarantee that the services mentioned in this resource list will be available to you or will improve your health or wellness.    [facility]

## 2024-04-08 NOTE — LETTER
[facility] CARE COORDINATION  [address]  [facility] [address]    April 8, 2024    Rajeev Linne  [address] [address]      Dear Rajeev,    I am a clinic care coordinator who works with TARA Nguyen CNP with the [facility] [place]. I wanted to introduce myself and provide you with my contact information for you to be able to call me with any questions or concerns. I wanted to thank you for spending the time to talk with me.  Below is a description of clinic care coordination and how I can further assist you.       The clinic care coordination team is made up of a registered nurse, , financial resource worker and community health worker who understand the health care system. The goal of clinic care coordination is to help you manage your health and improve access to the health care system. Our team works alongside your provider to assist you in determining your health and social needs. We can help you obtain health care and community resources, providing you with necessary information and education. We can work with you through any barriers and develop a care plan that helps coordinate and strengthen the communication between you and your care team.  Our services are voluntary and are offered without charge to you personally.    Please feel free to contact me with any questions or concerns regarding care coordination and what we can offer.      We are focused on providing you with the highest-quality healthcare experience possible.    Sincerely,     Radha Telles RN, BSN, CPHN, CCM  [facility] Ambulatory Care Management  [facility]  Phone: [phone]  Email: [email or web address]    Oriana MCLEOD [facility]  Community Health Worker  [facility]  Clinic Care Coordination  [phone]    Enclosed: I have enclosed a copy of the Patient Centered Plan of Care.  This has helpful information and goals that we have talked about. Please keep this in an easy to access place to use as needed. Also included is the information on Lorin Mcintosh (so you can get your completed Advance Care Planning documents on file with [facility].     [facility] Lorin Mcintosh provides support to [facility], [facility], and [facility] locations for ensuring system Advance Care Planning (ACP) and Surrogate Decision-Maker processes comply with Federal and State regulatory requirements and align with system goals and focus areas.      HOURS: Monday-Friday 6a to 5p (with exception of 8 department holidays as indicated on dept email/voicemail)  AFTER HOURS: For emergency validation of documents contact your assigned  per site protocol      CONTACT:    EMAIL: [email or web address]   PHONE: [phone]  EPIC: Lorin Mcintosh [facility]    Office: [address] [place] [address] (We are mostly remote with varying on-site hours)    [email or web address]

## 2024-04-09 ENCOUNTER — PATIENT OUTREACH (OUTPATIENT)
Dept: CARE COORDINATION | Facility: CLINIC | Age: 69
End: 2024-04-09
Payer: MEDICARE

## 2024-04-09 NOTE — PROGRESS NOTES
Clinic Care Coordination Contact    Shabana, patient's significant other called and left a voicemail message.    Shabana is requesting the contact information for the CC RN.  CHW provided the information.    Shabana stated patient needs a lot of help- recliner bed, food, in-home support.  Patient is currently in [place] on his way home to [place].    Shabana stated she will need financial help if she is going to stay home with patient to take care of him.  Shabana stated her income from Social Security is $789.00 per month.      Shabana stated she plans to attend an ALS support group tomorrow night.    CHW discussed with Shabana, a FRW referral has been placed.    CHW will route to CC RN.    LELE Solano  Clinic Care Coordination  [facility] Clinics: [facility] [place], and [facility]  Phone: [phone]

## 2024-04-10 ENCOUNTER — PATIENT OUTREACH (OUTPATIENT)
Dept: CARE COORDINATION | Facility: CLINIC | Age: 69
End: 2024-04-10
Payer: MEDICARE

## 2024-04-10 NOTE — PROGRESS NOTES
Clinic Care Coordination Contact    Situation: Patient chart reviewed by care coordinator.    Background: RN CC received call from his significant other, Hanane. C2C on file. She stated Jeremy is broke. So his statement that he can private pay for supplemental oxygen is not realistic. Patient is currently in [place]. He is physically stable.     Assessment: Hanane would like to talk to someone about being his paid caregiver. RN CC offered to send her information for [facility]. Also discussed Social Security Disability Income Specialist. She would like that information also. She is scheduled to go to a ALS meeting in [place] to get involved with a support group.     Plan/Recommendations: RN CC will send information on becoming patient's paid caregiver and SSDI resources to Hanane at patient's address. Verified address on file is valid.     Addendum: RN CC printed information while in clinic on 4/11/24 and mailed to Hanane via [employer].     Radha Telles RN, BSN, CPHN, CCM  [facility] Ambulatory Care Management  [facility]  Phone: [phone]  Email: [email or web address]

## 2024-04-10 NOTE — LETTER
4/10/24    Hanane Reyes. I have included the resources for [facility] for services and the resources for Disability Specialists.     [facility] Assessment and Support Planning  [facility]  [address], [place], [address] [phone]  Email: [email or web address]    For more information about these services, call Community Living Services Intake at [phone].  You will take the [place] to help determine your need, and you will receive information on how to get a  who specializes in Developmental Disability Services.  Even if you are not developmentally disabled, other services may fit your needs.    Support and services  The [facility] Assessment will identify the support and services each individual needs. Some of the options include:  In- and out-of-home respite care  In-home staff supports  Behavioral interventions  Community integration activities  Home or vehicle modifications  Therapies, special diets, or equipment not covered by insurance or other sources  Daily living skills  Employment supports  Housing supports  Recreational opportunities (see the Recreational Directory)  Personal care attendant  Self-directed options  Nursing    Disability Specialists  Phone:  Toll Free: [phone]  Direct: [phone]  Fax: [phone]  info@disabilityspecialists.net    Radha Telles RN, BSN, CPHN, [facility] Ambulatory Care Management  [facility]  Phone: [phone]  Email: [email or web address]

## 2024-04-11 ENCOUNTER — PATIENT OUTREACH (OUTPATIENT)
Dept: CARE COORDINATION | Facility: CLINIC | Age: 69
End: 2024-04-11
Payer: MEDICARE

## 2024-04-25 ENCOUNTER — TRANSCRIBE ORDERS (OUTPATIENT)
Dept: OTHER | Age: 69
End: 2024-04-25

## 2024-04-25 DIAGNOSIS — G12.29 BULBAR WEAKNESS (H): Primary | ICD-10-CM

## 2024-04-25 DIAGNOSIS — R25.3 TONGUE FASCICULATION: ICD-10-CM

## 2024-05-07 ENCOUNTER — PATIENT OUTREACH (OUTPATIENT)
Dept: CARE COORDINATION | Facility: CLINIC | Age: 69
End: 2024-05-07
Payer: MEDICARE

## 2024-05-07 NOTE — PROGRESS NOTES
Clinic Care Coordination Contact  Community Health Worker Follow Up    Reason: CCC CHW Follow Up Called (follow up current goal's)    Care Gaps:   Health Maintenance Due   Topic Date Due    COLORECTAL CANCER SCREENING  Never done    DTAP/TDAP/TD IMMUNIZATION (1 - Tdap) Never done    ZOSTER IMMUNIZATION (1 of 2) Never done    LUNG CANCER SCREENING  Never done    RSV VACCINE (Pregnancy & 60+) (1 - 1-dose 60+ series) Never done    MEDICARE ANNUAL WELLNESS VISIT  Never done    Pneumococcal Vaccine: 65+ Years (1 of 1 - PCV) Never done    AORTIC ANEURYSM SCREENING (SYSTEM ASSIGNED)  Never done    ADVANCE CARE PLANNING  08/29/2023    COVID-19 Vaccine (1 - 2023-24 season) Never done    PHQ-2 (once per calendar year)  01/01/2024     Defer to next CCC outreach follow up due to today's conversation.    Care Plan:   Care Plan: Financial Resource Worker       Problem: Financial Resource Worker       Goal: Financial Resource Worker       Start Date: 4/8/2024 Expected End Date: 10/7/2024    This Visit's Progress: 10% Recent Progress: 0%    Note:     Barriers: Early bulbar ALS; Limited income; Provider availability - wait time to complete appointments.   Strengths: Motivated; Agreeable to Care Coordination.   Patient expressed understanding of goal: Yes.   Action steps to achieve this goal:  1. I will respond to the Financial Resource Worker (FRW). (Updated: 5/07/2024)  2. I will return calls from the FRW promptly in order to complete assessments timely. (Updated: 5/07/2024)  3. I will contact my care team with questions, concerns or support needs. I will use the clinic as a resource and I understand I can contact my clinic with 24/7 after hours services available. Care Coordinator will remain available as needed. (Updated: 5/07/2024)  4. I will wait to hear from [facility] FRW again. (Updated: 5/07/2024)                            Patient chart reviewed:   -[facility] FRW have an encounter outreach dated 4/11/2024 per pt chart  "reviewed.    Patient Outreach Discussion:   CHW was able to connect with patient regard to reason above. Introduced self. Pt confirmed available to talked. Check in how patient is doing. Patient shared; Doing well and alive. Patient request CHW to speak with his significant others spoken person, Hanane.     CHW spoke with Hanane with pt present in the call. Introduced self. Explained reason of call. Hanane shared that she's the Power of  for patient and no longer interested in pursue the health care directive goal. CHW explained health care directive (Honoring choices) and suggested pt and Hanane to seek further details with [facility] or the Honoring Choices team. Hanane declined. Hanane request to delete the health care directive goal.      Hanane mentioned food and vegetable needs. CHW offered referral to Donal with the Resources Food program and Hanane declined and will utilized food shelf per Hanane. Hanane request FRW to connect with her to discuss financial and bill need. Pt and Hanane is aware CHW will send message to the FRW team to connect with pt/Hanane. Hanane agreed. Note/comment: Today's conversation was challenge with Hanane. No patient medical info discuss/shared with Hanane.    Hanane PARRA shared:  -Financial needs to pay Rajeev bills.   -I go to \"ALS group therapy\" and try to find help. (FYI: no details shared).  -We got help from \"CAP.\" Do you know what is \"CAP?\" (FYI: energy assistance per Hanane confirmed).  -Uses food shelf. We will go to the food shelf if need.     Coordinate Care:   CHW consulted and updates above conversation with CCC lead care coordinator Radha on 5/07/2024. CCC lead care coordinator agreed with send message to FRW team to connect with patient.     CHW suggested patient for the following:  -Pt connect CCC/CHW with any additional resources need and PCP office for scheduling appt.     CHW Next Follow-up: Goal's follow up. Informed patient of due care gaps (if any).     Outreach frequency: monthly      CHW " Plan:   -Message routed to FRW (FYI: encounter dated 4/11/2024) to connect with patient for further assistance regard to financial goal per pt's significant others/POA request.   -Next CHW outreach plan: 1 month.

## 2024-05-09 ENCOUNTER — TRANSFERRED RECORDS (OUTPATIENT)
Dept: HEALTH INFORMATION MANAGEMENT | Facility: CLINIC | Age: 69
End: 2024-05-09
Payer: MEDICARE

## 2024-05-10 ENCOUNTER — PATIENT OUTREACH (OUTPATIENT)
Dept: CARE COORDINATION | Facility: CLINIC | Age: 69
End: 2024-05-10
Payer: MEDICARE

## 2024-05-20 ENCOUNTER — PATIENT OUTREACH (OUTPATIENT)
Dept: CARE COORDINATION | Facility: CLINIC | Age: 69
End: 2024-05-20
Payer: MEDICARE

## 2024-05-20 NOTE — PROGRESS NOTES
Clinic Care Coordination Contact  Care Coordination Clinician Chart Review    Situation: Patient chart reviewed by Care Coordinator.       Background: Care Coordination Program started: 3/29/2024. Initial assessment completed and patient-centered care plan(s) were developed with participation from patient. Lead CC handed patient off to CHW for continued outreaches.       Assessment: Per chart review, patient outreach completed by CC CHW on 5/7/24.  Patient is actively working to accomplish goal(s). Patient's goal(s) appropriate and relevant at this time. Patient is not due for updated Plan of Care.  Assessments will be completed annually or as needed/with change of patient status.      Care Plan: Financial Resource Worker       Problem: Financial Resource Worker       Goal: Financial Resource Worker       Start Date: 4/8/2024 Expected End Date: 10/7/2024    This Visit's Progress: 10% Recent Progress: 0%    Note:     Barriers: Early bulbar ALS; Limited income; Provider availability - wait time to complete appointments.   Strengths: Motivated; Agreeable to Care Coordination.   Patient expressed understanding of goal: Yes.   Action steps to achieve this goal:  1. I will respond to the Financial Resource Worker (FRW). (Updated: 5/07/2024)  2. I will return calls from the FRW promptly in order to complete assessments timely. (Updated: 5/07/2024)  3. I will contact my care team with questions, concerns or support needs. I will use the clinic as a resource and I understand I can contact my clinic with 24/7 after hours services available. Care Coordinator will remain available as needed. (Updated: 5/07/2024)  4. I will wait to hear from [facility] FRW again. (Updated: 5/07/2024)                                 Plan/Recommendations: The patient will continue working with Care Coordination to achieve goal(s) as above. CHW will continue outreaches at minimum every 30 days and will involve Lead CC as needed or if patient is ready to  move to Maintenance. Lead CC will continue to monitor CHW outreaches and patient's progress to goal(s) every 6 weeks.     Plan of Care updated and sent to patient: Emelia Telles RN, BSN, CPHN, [facility] Ambulatory Care Management  [facility]  Phone: [phone]  Email: [email or web address]

## 2024-05-30 ENCOUNTER — TRANSFERRED RECORDS (OUTPATIENT)
Dept: HEALTH INFORMATION MANAGEMENT | Facility: CLINIC | Age: 69
End: 2024-05-30
Payer: MEDICARE

## 2024-05-30 ENCOUNTER — TELEPHONE (OUTPATIENT)
Dept: NEUROLOGY | Facility: CLINIC | Age: 69
End: 2024-05-30
Payer: MEDICARE

## 2024-05-30 NOTE — TELEPHONE ENCOUNTER
Per , they spoke with Hanane (CTC on file). First openings for new patients isn't until Mid August. Patient is concerned he may not make it that long as he is declining rapidly.  Any chance Dr. Jameson can see this patient with one of the DION slots coming up.    Will route to provider to review and advise if okay to use DION slot.     Miranda LOUIS RN, BSN  [facility]

## 2024-05-30 NOTE — TELEPHONE ENCOUNTER
[facility] Call Center    Phone Message    May a detailed message be left on voicemail: no     Reason for Call: Appointment Intake    Referring Provider Name: Caroline Louis NP  Diagnosis and/or Symptoms: Bulbar weakness, Tongue fasciculation, Respiratory weakness    Writer unable to schedule due to diagnosis not listed on protocols.    Amina is requesting a call back to Rajeev' significant other Hanane at [phone] to discuss scheduling. C2C on file.    Action Taken: Message routed to:  Other: [facility]    Travel Screening: Not Applicable

## 2024-05-31 NOTE — TELEPHONE ENCOUNTER
Writer called patient and informed a clinic appointment at the [facility] is being arranged and to expect a call soon for this appointment.

## 2024-06-01 ENCOUNTER — HEALTH MAINTENANCE LETTER (OUTPATIENT)
Age: 69
End: 2024-06-01

## 2024-06-03 ENCOUNTER — PATIENT OUTREACH (OUTPATIENT)
Dept: CARE COORDINATION | Facility: CLINIC | Age: 69
End: 2024-06-03
Payer: MEDICARE

## 2024-06-03 NOTE — PROGRESS NOTES
Clinic Care Coordination Contact    Situation: Patient chart reviewed by care coordinator.    Background: Patient is enrolled in Care Coordination and followed by [facility] and RN CC.     Assessment: RN CC received inbasket message that patient's signifcant (Hanane) other is trying to reach RN CC. Contacted Hanane. She is trying to get Jeremy in to see a Dr. Jameson at the [address] [place] [address] as soon as possible. Hanane has been communicating with Otilia Jain NP at the [facility] to try and make an appointment with Dr. Jameson.     Plan/Recommendations: RN CC will connect with Otilia to ensure any action won't complicate her attempts.     Radha Telles RN, BSN, CPHN, CCM  [facility] Ambulatory Care Management  [facility]  Phone: [phone]  Email: [email or web address]

## 2024-06-03 NOTE — TELEPHONE ENCOUNTER
Dr. Jameson recommmends:  This is an ALS referral, it should not be seen in [place].   Lindsey, please give this patient first available appt with Dr Rossi, Walk or myself .     received call from patient family this morning since they have not heard from [facility] yet.  They stated pt is critical and that they want him seen soon.     Will forward to team at [facility] to see if they can help with this.     Miranda LOUIS RN, BSN  [facility]

## 2024-06-04 DIAGNOSIS — M62.81 MUSCLE WEAKNESS (GENERALIZED): Primary | ICD-10-CM

## 2024-06-05 ENCOUNTER — PATIENT OUTREACH (OUTPATIENT)
Dept: CARE COORDINATION | Facility: CLINIC | Age: 69
End: 2024-06-05

## 2024-06-10 ENCOUNTER — OFFICE VISIT (OUTPATIENT)
Dept: PULMONOLOGY | Facility: CLINIC | Age: 69
End: 2024-06-10
Payer: MEDICARE

## 2024-06-10 ENCOUNTER — OFFICE VISIT (OUTPATIENT)
Dept: NEUROLOGY | Facility: CLINIC | Age: 69
End: 2024-06-10
Payer: MEDICARE

## 2024-06-10 VITALS
SYSTOLIC BLOOD PRESSURE: 190 MMHG | WEIGHT: 179 LBS | HEART RATE: 56 BPM | HEIGHT: 69 IN | DIASTOLIC BLOOD PRESSURE: 91 MMHG | OXYGEN SATURATION: 98 % | BODY MASS INDEX: 26.51 KG/M2

## 2024-06-10 DIAGNOSIS — M62.81 MUSCLE WEAKNESS (GENERALIZED): ICD-10-CM

## 2024-06-10 DIAGNOSIS — G12.21 ALS (AMYOTROPHIC LATERAL SCLEROSIS) (H): ICD-10-CM

## 2024-06-10 DIAGNOSIS — R25.3 TONGUE FASCICULATION: ICD-10-CM

## 2024-06-10 PROCEDURE — 99204 OFFICE O/P NEW MOD 45 MIN: CPT | Mod: GC | Performed by: PSYCHIATRY & NEUROLOGY

## 2024-06-10 PROCEDURE — 94375 RESPIRATORY FLOW VOLUME LOOP: CPT | Performed by: INTERNAL MEDICINE

## 2024-06-10 PROCEDURE — G2211 COMPLEX E/M VISIT ADD ON: HCPCS | Performed by: PSYCHIATRY & NEUROLOGY

## 2024-06-10 ASSESSMENT — PAIN SCALES - GENERAL: PAINLEVEL: NO PAIN (0)

## 2024-06-10 NOTE — NURSING NOTE
Chief Complaint   Patient presents with    Als     New patient      Vitals were taken and medications were reconciled.    Saurav Tilley, EMT  8:05 AM
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
## 2024-06-10 NOTE — PATIENT INSTRUCTIONS
I agree with the diagnosis of bulbar ALS  I will review your PFT today and will offer you BiPAP and cough assist if we feel it is necessary    Please return to ALS Clinic in 10 days- you will meet our speech therapist, , dietician, and ALS clinic nurse    I think we should start thinking about feeding tube placement given the degree of your weight loss    Please bring your list of medications and supplements in 10 days for me to review
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
## 2024-06-11 DIAGNOSIS — G12.21 ALS (AMYOTROPHIC LATERAL SCLEROSIS) (H): Primary | ICD-10-CM

## 2024-06-11 LAB
EXPTIME-PRE: 8.11 SEC
FEF2575-%PRED-PRE: 93 %
FEF2575-PRE: 2.21 L/SEC
FEF2575-PRED: 2.37 L/SEC
FEFMAX-%PRED-PRE: 62 %
FEFMAX-PRE: 5.19 L/SEC
FEFMAX-PRED: 8.32 L/SEC
FEV1-%PRED-PRE: 81 %
FEV1-PRE: 2.42 L
FEV1FEV6-PRE: 50 %
FEV1FEV6-PRED: 78 %
FEV1FVC-PRE: 56 %
FEV1FVC-PRED: 77 %
FIFMAX-PRE: 1.41 L/SEC
FVC-%PRED-PRE: 112 %
FVC-PRE: 4.3 L
FVC-PRED: 3.83 L
MEP-PRE: 53 CMH2O
MIP-PRE: -59 CMH2O

## 2024-06-17 DIAGNOSIS — G12.21 ALS (AMYOTROPHIC LATERAL SCLEROSIS) (H): Primary | ICD-10-CM

## 2024-06-19 ENCOUNTER — PATIENT OUTREACH (OUTPATIENT)
Dept: CARE COORDINATION | Facility: CLINIC | Age: 69
End: 2024-06-19
Payer: MEDICARE

## 2024-06-19 NOTE — PROGRESS NOTES
Clinic Care Coordination Contact  Community Health Worker Follow Up    Care Gaps:   Health Maintenance Due   Topic Date Due    COLORECTAL CANCER SCREENING  Never done    DTAP/TDAP/TD IMMUNIZATION (1 - Tdap) Never done    ZOSTER IMMUNIZATION (1 of 2) Never done    LUNG CANCER SCREENING  Never done    RSV VACCINE (Pregnancy & 60+) (1 - 1-dose 60+ series) Never done    MEDICARE ANNUAL WELLNESS VISIT  Never done    Pneumococcal Vaccine: 65+ Years (1 of 1 - PCV) Never done    AORTIC ANEURYSM SCREENING (SYSTEM ASSIGNED)  Never done    ADVANCE CARE PLANNING  08/29/2023    COVID-19 Vaccine (1 - 2023-24 season) Never done     Declined due to Hanane expressing that she is not interested in scheduling more at this time.     Care Plan:   Care Plan: Financial Resource Worker       Problem: Financial Resource Worker       Goal: Financial Resource Worker       Start Date: 4/8/2024 Expected End Date: 10/7/2024    This Visit's Progress: 80% Recent Progress: 10%    Note:     Barriers: Early bulbar ALS; Limited income; Provider availability - wait time to complete appointments.   Strengths: Motivated; Agreeable to Care Coordination.   Patient expressed understanding of goal: Yes.   Action steps to achieve this goal:  1. I will respond to the Financial Resource Worker (FRW). (Updated: 5/07/2024)  2. I will return calls from the FRW promptly in order to complete assessments timely. (Updated: 5/07/2024)  3. I will contact my care team with questions, concerns or support needs. I will use the clinic as a resource and I understand I can contact my clinic with 24/7 after hours services available. Care Coordinator will remain available as needed. (Updated: 5/07/2024)  4. I will wait to hear from [facility] FRW again. (Updated: 5/07/2024)                              Intervention and Education during outreach:     Spoke to Patient's Caregiver (Hanane)  CHW Introduced intent of call regarding monthly follow up.   Hanane reports that things are going  okay.  Hanane expressed that Patient has been approved for SNAP. Further expressing that Patient has been able to utilize SNAP.    Hanane expressed that additional support is needed, however she will try her best to connect with [facility] through [facility]. Hanane declined CHW support. Further expressing that she will let CHW/ CCC know if additional support is needed.   Hanane declined CHW from confirming upcoming appointments.    CHW encourages Hanane to contact CHW/ CCC Team if additional support is needed. CHW provides Hanane with CHW's contact information. Hanane declined.     CHW Plan: Next CHW Outreach in One Month     Delia Ricci CHW  Clinic Care Coordination   [facility]   Phone: [phone]  [email or web address]

## 2024-06-20 ENCOUNTER — ALLIED HEALTH/NURSE VISIT (OUTPATIENT)
Dept: NEUROLOGY | Facility: CLINIC | Age: 69
End: 2024-06-20

## 2024-06-20 ENCOUNTER — PATIENT OUTREACH (OUTPATIENT)
Dept: CARE COORDINATION | Facility: CLINIC | Age: 69
End: 2024-06-20

## 2024-06-20 ENCOUNTER — THERAPY VISIT (OUTPATIENT)
Dept: SPEECH THERAPY | Facility: CLINIC | Age: 69
End: 2024-06-20
Payer: MEDICARE

## 2024-06-20 ENCOUNTER — OFFICE VISIT (OUTPATIENT)
Dept: NEUROLOGY | Facility: CLINIC | Age: 69
End: 2024-06-20
Payer: MEDICARE

## 2024-06-20 VITALS
OXYGEN SATURATION: 99 % | HEART RATE: 56 BPM | SYSTOLIC BLOOD PRESSURE: 169 MMHG | DIASTOLIC BLOOD PRESSURE: 87 MMHG | BODY MASS INDEX: 26.45 KG/M2 | WEIGHT: 178.2 LBS

## 2024-06-20 DIAGNOSIS — R13.12 OROPHARYNGEAL DYSPHAGIA: ICD-10-CM

## 2024-06-20 DIAGNOSIS — G12.21 ALS (AMYOTROPHIC LATERAL SCLEROSIS) (H): Primary | ICD-10-CM

## 2024-06-20 DIAGNOSIS — G12.21 ALS (AMYOTROPHIC LATERAL SCLEROSIS) (H): ICD-10-CM

## 2024-06-20 DIAGNOSIS — R00.0 TACHYCARDIA: Primary | ICD-10-CM

## 2024-06-20 DIAGNOSIS — R47.1 DYSARTHRIA: ICD-10-CM

## 2024-06-20 DIAGNOSIS — Z71.89 COUNSELING AND COORDINATION OF CARE: Primary | ICD-10-CM

## 2024-06-20 DIAGNOSIS — F48.2 PBA (PSEUDOBULBAR AFFECT): ICD-10-CM

## 2024-06-20 PROCEDURE — 93010 ELECTROCARDIOGRAM REPORT: CPT | Performed by: INTERNAL MEDICINE

## 2024-06-20 PROCEDURE — 92526 ORAL FUNCTION THERAPY: CPT | Mod: GN | Performed by: SPEECH-LANGUAGE PATHOLOGIST

## 2024-06-20 PROCEDURE — 92507 TX SP LANG VOICE COMM INDIV: CPT | Mod: GN | Performed by: SPEECH-LANGUAGE PATHOLOGIST

## 2024-06-20 PROCEDURE — 92610 EVALUATE SWALLOWING FUNCTION: CPT | Mod: GN | Performed by: SPEECH-LANGUAGE PATHOLOGIST

## 2024-06-20 PROCEDURE — G2211 COMPLEX E/M VISIT ADD ON: HCPCS | Performed by: PSYCHIATRY & NEUROLOGY

## 2024-06-20 PROCEDURE — 92522 EVALUATE SPEECH PRODUCTION: CPT | Mod: GN | Performed by: SPEECH-LANGUAGE PATHOLOGIST

## 2024-06-20 PROCEDURE — 99214 OFFICE O/P EST MOD 30 MIN: CPT | Performed by: PSYCHIATRY & NEUROLOGY

## 2024-06-20 RX ORDER — AMINOCAPROIC ACID 0.25 G/ML
SYRUP ORAL EVERY 6 HOURS
COMMUNITY

## 2024-06-20 RX ORDER — CYANOCOBALAMIN (VITAMIN B-12) 500 MCG
400 LOZENGE ORAL DAILY
COMMUNITY

## 2024-06-20 RX ORDER — DIMENHYDRINATE 50 MG
1 TABLET ORAL DAILY
COMMUNITY

## 2024-06-20 RX ORDER — CALCIUM CARBONATE/VITAMIN D3 500-10/5ML
LIQUID (ML) ORAL
COMMUNITY

## 2024-06-20 RX ORDER — MULTIVITAMIN WITH IRON
1 TABLET ORAL DAILY
COMMUNITY

## 2024-06-20 RX ORDER — CYANOCOBALAMIN (VITAMIN B-12) 2500 MCG
TABLET, SUBLINGUAL SUBLINGUAL DAILY
COMMUNITY

## 2024-06-20 ASSESSMENT — REVISED AMYOTROPHIC LATERAL SCLEROSIS FUNCTIONAL RATING SCALE (ALSFRS-R)
HANDWRITING: NORMAL
SWALLOWING: 2
HANDWRITING: 4
SIX_ITEM_SUBTOTAL: 19
SWALLOWING: DIETARY CONSISTENCY CHANGES
GROSS_MOTOR_SUBTOTAL_SCORE: 12
WALKING: NORMAL
ALSFRS_TOTAL_SCORE: 39
CUTTING_FOOD_AND_HANDLING_UTENSILES: 4
RESPIRATORY_SUBTOTAL_SCORE: 9
DYSPNEA: 1
TURNING_IN_BED_AND_ADJUSTING_BED_CLOTHES: 4
SPEECH: INTELLIGIBLE WITH REPEATING
SALIVATION: MODERATELY EXCESSIVE SALIVA; MAY HAVE MINIMAL DROOLING
SPEECH: 2
CLIMBING_STAIRS: NORMAL
ORTHOPENA: 4
TURNING_IN_BED_AND_ADJUSTING_BED_CLOTHES: NORMAL
DRESSING_AND_HYGEINE: 4
CLIMBING_STAIRS: 4
SALIVATION: 2
BULBAR_SUBTOTAL: 6
DRESSING_AND_HYGEINE: NORMAL FUNCTION
RESPIRATORY_INSUFFICIENCY: 4
FINE_MOTOR_SUBTOTAL_SCORE: 12
WALKING: 4
DYSPNEA: OCCURS AT REST: DIFFICULTY BREATHING WHEN EITHER SITTING OR LYING

## 2024-06-20 ASSESSMENT — PAIN SCALES - GENERAL: PAINLEVEL: NO PAIN (0)

## 2024-06-20 NOTE — PROGRESS NOTES
"CLINICAL NUTRITION SERVICES - ASSESSMENT NOTE    Rajeev Montalvo 68 year old referred for MNT related to ALS    Visit Type: Initial  Referring Physician: Dr. Jameson  Pt accompanied by: Hanane    NUTRITION HISTORY  Factors affecting nutrition intake include:Eating fatigue  Current diet: Regular diet. Avoids bred, no nuts, avoids gluten (occasionally).  Current appetite/intake: Good, no decreased appetite noted. Shares he \"eats all the time\".   PEG Tube: N/A    Diet Recall  Breakfast Omelet, sausage and egg on a croissant   Lunch Shrimp, tator tot hotdish   Dinner Sloppy joes, turkey stew, tacos, enchillada, pizza   Snacks Ice cream, banana, blue berry, salted nut roll, donut   Beverages Water, gatorade, OJ, [place]     ANTHROPOMETRICS  Height: 1.748 m (5' 8.82\")   Weight: 81.2 kg (179 lb)   BMI: 26.4 kg/m2  Weight Status:  Overweight BMI 25-29.9  IBW: 72.7 kg  % IBW: 112  Weight History: Pt notes 20# wt loss since diagnosis. No wt data available from [id number].  Wt Readings from Last 10 Encounters:   06/10/24 81.2 kg (179 lb)   12/08/23 79.4 kg (175 lb)   12/08/23 79.4 kg (175 lb)   10/02/19 88.5 kg (195 lb)   11/05/18 91.3 kg (201 lb 3.2 oz)   08/29/18 93.4 kg (206 lb)     Dosing Weight: 81 kg    Medications/vitamins/minerals/herbals:   Reviewed    LABS  Labs reviewed    ASSESSED NUTRITION NEEDS:  Estimated Energy Needs: 3971-7446 kcals (25-30 Kcal/Kg)  Justification: increased needs associated with progression of ALS  Estimated Protein Needs:  grams protein (1-1.2 g pro/Kg)  Justification: increased needs associated with progression of ALS  Estimated Fluid Needs: 2025  mL   Justification: maintenance    NUTRITION DIAGNOSIS:  Inadequate oral intake related to eating fatigue as evidenced by patient notes 20# wt loss since diagnosis.     INTERVENTIONS  Recommendations / Nutrition Prescription  1. Soft foods diet, per SLP.  2. Add one protein shake or protein powder to your daily smoothie.    Nutrition " Education     Provided written & verbal education on:   - Ways to maximize kcal and protein intake. Discussed calorie and protein needs for maintenance of weight and nutrition status.  Advised pt to aim for at least 2025 kcal and 80 g protein via 5-6 small frequent meals.  Discussed that as ALS progresses, eating may become more difficult and discussed ways to cope with this.   - Discussed potential need for G-tube.      - ONS (Ensure Enlive, Ensure Plus/Boost Plus, Benecalorie, etc). Suggested ways to incorporate these supplements to avoid flavor fatigue. Encouraged pt to start consuming 1ONS daily.       Provided pt with corresponding education materials/handouts on:  Six Small Meals a Day, High Calorie, High Protein Recipe booklet, Tips to Increase the Calories in Your Diet, Tips to Increase the Protein in Your Diet and Protein Sources.      Pt verbalize understanding of materials provided during consult.   Patient Understanding: Excellent  Expected Compliance: Excellent     Implementation  Nutrition-related Medication Management, Composition of Meals and Snacks, and General/healthful diet    Goals  1.  Aim for 5-6 small frequent meals  2.  Aim for 2025 kcal and 80 g protein/day  3. Weight maintenance      Follow-Up Plans: Pt has RD contact information for questions.    Pt encouraged to follow up with RD at next clinic visit in 3-6 months.    MONITORING AND EVALUATION:  -Food intake  -Fluid/beverage intake  -Liquid meal replacement or supplement  -Weight trends    Abeba Villanueva RDN, LD

## 2024-06-20 NOTE — LETTER
6/20/2024       RE: Rajeev Montalvo  [address] [address]     Dear Colleague,    Thank you for referring your patient, Rajeev Montalvo, to the [facility] [place] [facility]. Please see a copy of my visit note below.    Sofia Nolasco, CNP (PCP)  [place], June 20, 2024    Dear Ms Nolasco,    I had the pleasure to see Rajeev in follow-up at the [facility] today.  He is here to meet our multidisciplinary team.  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  He declined EMG study, but he had very characteristic spastic dysarthria, tongue atrophy, and fasciculations, with a negative brain MRI, and progressive course over the past 13 months, leaving no doubt about the diagnosis.    He will meet our speech-language pathologist and registered dietitian today.  He had a number of questions about appropriate diet in ALS and I emphasized to him that we aim for high-calorie diets, we avoid intermittent fasting or any diets that can promote weight loss.  It appears that he has lost a total of 17, and not 40 pounds, since the onset of this disease.  This is less than 10% of his weight.  We discussed gastrostomy placement and we agreed that if we can arrest the weight loss with appropriate nutritional interventions, this may not be necessary, at least right now.  At the same time, I want to see what our SLP will say because if his dysphagia is severe and he aspirates most food consistencies, then we should expedite the gastrostomy placement, provided that the patient agrees.    He still declines disease modifying drugs for ALS like riluzole or Radicava.  He has previously declined glycopyrrolate for sialorrhea.  He is interested in pridopidine which is a research (not FDA approved) drug for ALS studied in the [employer] platform  trial.  There is an EAP program for this drug, which may be available in our institution.  I will ask our research coordinator to discuss our current studies with him, including natural history studies.    He declined genetic testing. He currently has no upper or lower extremity weakness.    BP (!) 169/87   Pulse 56   Wt 80.8 kg (178 lb 3.2 oz)   SpO2 99%   BMI 26.45 kg/m          Latest Ref Rng & Units 6/10/2024     9:31 AM   PFT   FVC L 4.30    FEV1 L 2.42    FVC% % 112    FEV1% % 81        Neuro exam was not repeated.    In summary, the patient has bulbar onset ALS.  Other than RD and SLP evaluations today, we will set him up an appointment with our pulmonologist.  His pulmonary function test 10 days ago showed an obstructive, not restrictive pattern, with % and FEV1 approximately 80%.  MIP was -59 cmH2O.  This is not a typical pattern for ALS related diaphragm weakness.  Obstruction could be due to COPD or asthma.  Pulmonologist would be helpful in that regard.  At the same time, we will get a nocturnal oximetry and if he has significant desaturations, he would qualify for NIV and, we will offer him the device.    CoughAssist device is medically necessary, as explained in my note 10 days ago, and we will offer it to him.    We will get an EKG today and if it is normal we will prescribe him Nuedexta.  I explained to him that this drug has minimal side effects, and is very effective for pseudobulbar affect, which he definitely has.  At the same time, a recent study showed that early use of Nuedexta can slow down the decline of speech and swallowing function and bulbar onset ALS, or even cause transient improvement of patient's speech.  He was very excited to hear about this.    Follow-up in 3 months, sooner if needed.  He will be registered with the ALSA today      Total time spent on this encounter today 30 minutes of which 15 face-to-face, 10 in postvisit note dictation, editing, and orders and 5 in  previsit chart review.    The longitudinal plan of care for the diagnosis(es)/condition(s) as documented were addressed during this visit. Due to the added complexity in care, I will continue to support Rajeev in the subsequent management and with ongoing continuity of care.        Again, thank you for allowing me to participate in the care of your patient.      Sincerely,    Jorge Jameson MD

## 2024-06-20 NOTE — PATIENT INSTRUCTIONS
Follow up 3 months    Your breathing test 10 days ago did not look typical for ALS related muscle weakness.  There was evidence of obstruction, as can be seen with COPD or asthma.  We will set you up an appointment with a lung doctor. We have a new provider and he does not have a schedule yet. We will contact you once appts are available.   At the same time, I would like you to get the oximetry at night to see if you lose any oxygen during sleep.  If you lose oxygen during sleep, it is not from the asthma, and in that case I would strongly consider offering you the BiPAP machine.    We will offer you the Cough Assist device. They will call you set up delivery once they have insurance approval.    We will get an EKG today and if it is normal, I will offer you a drug called Nuedexta which may help your speech and slow down his decline.  It will also help the pseudobulbar affect and ALS. NOTE: This medication can be difficult to get insurance approval and may take some time.    You will see our research coordinator today and you can ask her as many questions as you want.    You will see our speech therapist and our dietitian today.  The goal in ALS is high calorie diets, and we do not impose any restrictions on carbs or fat.  The goal is to prevent weight loss.    It appears that you have lost about 17 pounds of weight.  If we can arrest the weight loss with high-calorie diet, you probably will not need a feeding tube yet, but we will revisit this discussion down the way.    The speech therapist has ordered a Video Swallow study. You will be called by them to schedule.    Please call Lindsey @ [phone] for questions or concerns during regular business hours. For a more efficient way to communicate, use Hypercontext and address the message to your physician. Remember, MyChart is only read during business hours. Do not leave urgent messages on voicemail or MyChart. If situation is urgent, contact the Neurology Clinic @  [phone] and ask to speak to a Triage Nurse or Call 911 or visit an Emergency Department.    Please call your pharmacy if you need a medication refill. They will send us an electronic message.
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
## 2024-06-20 NOTE — Clinical Note
"We have a Pt in common. It looks like you guys might be wrapping up?  I\"ll be following in ALS clinic on going.  Thanks!"

## 2024-06-20 NOTE — PROGRESS NOTES
Sofia Nolasco, CNP (PCP)  [facility], June 20, 2024    Dear Ms Taylorselmadorcas,    I had the pleasure to see Rajeev in follow-up at the [facility]SA [facility] today.  He is here to meet our multidisciplinary team.  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  He declined EMG study, but he had very characteristic spastic dysarthria, tongue atrophy, and fasciculations, with a negative brain MRI, and progressive course over the past 13 months, leaving no doubt about the diagnosis.    He will meet our speech-language pathologist and registered dietitian today.  He had a number of questions about appropriate diet in ALS and I emphasized to him that we aim for high-calorie diets, we avoid intermittent fasting or any diets that can promote weight loss.  It appears that he has lost a total of 17, and not 40 pounds, since the onset of this disease.  This is less than 10% of his weight.  We discussed gastrostomy placement and we agreed that if we can arrest the weight loss with appropriate nutritional interventions, this may not be necessary, at least right now.  At the same time, I want to see what our SLP will say because if his dysphagia is severe and he aspirates most food consistencies, then we should expedite the gastrostomy placement, provided that the patient agrees.    He still declines disease modifying drugs for ALS like riluzole or Radicava.  He has previously declined glycopyrrolate for sialorrhea.  He is interested in pridopidine which is a research (not FDA approved) drug for ALS studied in the SHELIA platform trial.  There is an EAP program for this drug, which may be available in our institution.  I will ask our research coordinator to discuss our current studies with him, including natural history studies.    He declined genetic testing. He currently has no upper or lower extremity weakness.    BP (!) 169/87   Pulse 56   Wt  80.8 kg (178 lb 3.2 oz)   SpO2 99%   BMI 26.45 kg/m          Latest Ref Rng & Units 6/10/2024     9:31 AM   PFT   FVC L 4.30    FEV1 L 2.42    FVC% % 112    FEV1% % 81        Neuro exam was not repeated.    In summary, the patient has bulbar onset ALS.  Other than RD and SLP evaluations today, we will set him up an appointment with our pulmonologist.  His pulmonary function test 10 days ago showed an obstructive, not restrictive pattern, with % and FEV1 approximately 80%.  MIP was -59 cmH2O.  This is not a typical pattern for ALS related diaphragm weakness.  Obstruction could be due to COPD or asthma.  Pulmonologist would be helpful in that regard.  At the same time, we will get a nocturnal oximetry and if he has significant desaturations, he would qualify for NIV and, we will offer him the device.    CoughAssist device is medically necessary, as explained in my note 10 days ago, and we will offer it to him.    We will get an EKG today and if it is normal we will prescribe him Nuedexta.  I explained to him that this drug has minimal side effects, and is very effective for pseudobulbar affect, which he definitely has.  At the same time, a recent study showed that early use of Nuedexta can slow down the decline of speech and swallowing function and bulbar onset ALS, or even cause transient improvement of patient's speech.  He was very excited to hear about this.    Follow-up in 3 months, sooner if needed.  He will be registered with the ALSA today    Sincerely,    Jorge Jameson MD, FAAN    Total time spent on this encounter today 30 minutes of which 15 face-to-face, 10 in postvisit note dictation, editing, and orders and 5 in previsit chart review.    The longitudinal plan of care for the diagnosis(es)/condition(s) as documented were addressed during this visit. Due to the added complexity in care, I will continue to support Rajeev in the subsequent management and with ongoing continuity of care.

## 2024-06-21 DIAGNOSIS — G12.21 ALS (AMYOTROPHIC LATERAL SCLEROSIS) (H): Primary | ICD-10-CM

## 2024-06-21 DIAGNOSIS — R13.10 DYSPHAGIA: ICD-10-CM

## 2024-06-21 LAB
ATRIAL RATE - MUSE: 58 BPM
DIASTOLIC BLOOD PRESSURE - MUSE: NORMAL MMHG
INTERPRETATION ECG - MUSE: NORMAL
P AXIS - MUSE: 71 DEGREES
PR INTERVAL - MUSE: 156 MS
QRS DURATION - MUSE: 104 MS
QT - MUSE: 426 MS
QTC - MUSE: 418 MS
R AXIS - MUSE: 38 DEGREES
SYSTOLIC BLOOD PRESSURE - MUSE: NORMAL MMHG
T AXIS - MUSE: 37 DEGREES
VENTRICULAR RATE- MUSE: 58 BPM

## 2024-06-25 DIAGNOSIS — G12.21 ALS (AMYOTROPHIC LATERAL SCLEROSIS) (H): Primary | ICD-10-CM

## 2024-06-27 ENCOUNTER — PATIENT OUTREACH (OUTPATIENT)
Dept: CARE COORDINATION | Facility: CLINIC | Age: 69
End: 2024-06-27
Payer: MEDICARE

## 2024-06-27 NOTE — PROGRESS NOTES
Clinic Care Coordination Contact  Care Coordination Clinician Chart Review    Situation: Patient chart reviewed by Care Coordinator.       Background: Care Coordination Program started: 3/29/2024. Initial assessment completed and patient-centered care plan(s) were developed with participation from patient. Lead CC handed patient off to CHW for continued outreaches.       Assessment: Per chart review, patient outreach completed by CC CHW on 6/19/24.  Patient is actively working to accomplish goal(s). Patient's goal(s) appropriate and relevant at this time. Patient is due for updated Plan of Care.  Assessments will be completed annually or as needed/with change of patient status.      Care Plan: Financial Resource Worker       Problem: Financial Resource Worker       Goal: Financial Resource Worker       Start Date: 4/8/2024 Expected End Date: 10/7/2024    This Visit's Progress: 90% Recent Progress: 80%    Note:     Barriers: Early bulbar ALS; Limited income; Provider availability - wait time to complete appointments.   Strengths: Motivated; Agreeable to Care Coordination.   Patient expressed understanding of goal: Yes.   Action steps to achieve this goal:  1. I will respond to the Financial Resource Worker (FRW). (Updated: 5/07/2024)  2. I will return calls from the FRW promptly in order to complete assessments timely. (Updated: 5/07/2024)  3. I will contact my care team with questions, concerns or support needs. I will use the clinic as a resource and I understand I can contact my clinic with 24/7 after hours services available. Care Coordinator will remain available as needed. (Updated: 5/07/2024)  4. I will wait to hear from [facility] FRW again. (Updated: 5/07/2024)                               Plan/Recommendations: The patient will continue working with Care Coordination to achieve goal(s) as above. CHW will continue outreaches at minimum every 30 days and will involve Lead CC as needed or if patient is ready to move  to Maintenance. Lead CC will continue to monitor CHW outreaches and patient's progress to goal(s) every 6 weeks.     Plan of Care updated and sent to patient: Yes, via [employer]    Radha Telles RN, BSN, CPHN, CCM  [facility]  [facility]  Phone: [phone]  Email: [email or web address]

## 2024-06-27 NOTE — LETTER
Dear Rajeev & Hanane,   Attached is an updated Patient Centered Plan of Care for your continued enrollment in Care Coordination. Please let us know if you have additional questions, concerns or goals that we can assist with.     Sincerely,   Radha Telles RN, BSN, CPHN, CM  [facility] Ambulatory Care Management  [facility]  Phone: [phone]  Email: [email or web address]  Patient Centered Plan of Care  About Me:        Patient Name:  Rajeev Montalvo    YOB: 1955  Age:         68 year old   [place] MRN:    [id number] Telephone Information:  Home Phone [phone]   Mobile [phone]       Address:  [address] [place] [address] Email address:  [email or web address]      Emergency Contact(s)    Name Relationship Lgl Grd Work Phone Home Phone Mobile Phone   1. HANANE SANCHEZ Significant ot*   [phone]    2. CRISTIN WHITNEY Friend   [phone]   3. VICKIE CATHERINE Friend   [phone]           Primary language:  English     needed? No   [place] [facility]:  [phone] op. 1  Other communication barriers:Physical impairment (Bulbar ALS)    Preferred Method of Communication:  Mail  Current living arrangement: I live in a private home with spouse    Mobility Status/ Medical Equipment: Independent    Health Maintenance  Health Maintenance Reviewed: Due/Overdue (Discussed with patient.)    My Access Plan  Medical Emergency 911   Primary Clinic Line [facility] - [phone]   24 Hour Appointment Line [phone] or  [phone]) (toll-free)   24 Hour Nurse Line [phone] (toll-free)   Preferred Urgent Care No data recorded   Preferred Hospital [facility]  [phone]     Preferred Pharmacy [id number] - Closed - [place], [place] - [address]     Behavioral Health Crisis Line The  National Suicide Prevention Lifeline at 1-113.670.6367 or Text/Call 988     My Care Team Members  Patient Care Team         Relationship Specialty Notifications Start End    Hanane Nolasco APRN CNP PCP - General Internal Medicine  4/8/24     Phone: [phone] Fax: [phone]         [address] [facility] [address]    Radha Telles, RN Lead Care Coordinator Primary Care - CC Admissions 3/29/24     Phone: [phone]         Oriana Fulton CHW Community Health Worker  Admissions 4/8/24     Phone: [phone]         Erin Telles MA Financial Resource Worker   4/9/24     Phone: [phone]         Lisa Corral JACLYN Community Health Worker  Admissions 4/27/24     Phone: [phone]         Jorge Jameson MD Assigned Neuroscience Provider   6/23/24     Phone: [phone] Fax: [phone]         [address] [facility] [id number] [facility] [address]                My Care Plans  Self Management and Treatment Plan    Care Plan  Care Plan: Financial Resource Worker       Problem: Financial Resource Worker       Goal: Financial Resource Worker       Start Date: 4/8/2024 Expected End Date: 10/7/2024    This Visit's Progress: 90% Recent Progress: 80%    Note:     Barriers: Early bulbar ALS; Limited income; Provider availability - wait time to complete appointments.   Strengths: Motivated; Agreeable to Care Coordination.   Patient expressed understanding of goal: Yes.   Action steps to achieve this goal:  1. I will respond to the Financial Resource Worker (FRW). (Updated: 5/07/2024)  2. I will return calls from the FRW promptly in order to complete assessments timely. (Updated: 5/07/2024)  3. I will contact my care team with questions, concerns or support needs. I will use the clinic as a resource and I understand I can contact my clinic with 24/7 after hours services available. Care Coordinator will remain available as needed. (Updated: 5/07/2024)  4. I will wait to hear from [facility] FRW  again. (Updated: 5/07/2024)                            Action Plans on File:     Advance Care Plans/Directives:   Advanced Care Plan/Directives on file:   No    Discussed with patient/caregiver(s):   Declined Further Information             My Medical and Care Information  Problem List   Patient Active Problem List   Diagnosis    HTN (hypertension)    PTSD (post-traumatic stress disorder)    HSV (herpes simplex virus) infection    Hiatal hernia    Gastroesophageal reflux disease with esophagitis    Speech abnormality      Current Medications and Allergies:    Current Outpatient Medications   Medication Sig Dispense Refill    albuterol (PROAIR HFA/PROVENTIL HFA/VENTOLIN HFA) 108 (90 Base) MCG/ACT inhaler Inhale 2 puffs into the lungs every 6 hours as needed for shortness of breath, wheezing or cough (Patient not taking: Reported on 6/10/2024) 54 g 1    albuterol (PROVENTIL) (2.5 MG/3ML) 0.083% neb solution Take 1 vial (2.5 mg) by nebulization every 6 hours as needed for shortness of breath, wheezing or cough (Patient not taking: Reported on 6/10/2024) 90 mL 0    aminocaproic acid (AMICAR) 0.25 GM/ML solution Take by mouth every 6 hours      amLODIPine (NORVASC) 10 MG tablet TAKE 1 TABLET BY MOUTH DAILY. (Patient not taking: Reported on 12/8/2023) 90 tablet 3    amoxicillin-clavulanate (AUGMENTIN) 875-125 MG tablet Take 1 tablet by mouth 2 times daily (Patient not taking: Reported on 6/10/2024) 20 tablet 0    betaine (TMG) 500 MG CAPS capsule       dextromethorphan-quiNIDine (NUEDEXTA) 20-10 MG capsule 1 capsule daily for 1 week, then 1 capsule twice a day thereafter 60 capsule 2    Flaxseed, Linseed, (FLAX SEED OIL) 1000 MG capsule Take 1 capsule by mouth daily      magnesium 250 MG tablet Take 1 tablet by mouth daily      magnesium oxide 400 MG CAPS       neomycin-polymyxin-hydrocortisone (CORTISPORIN) 3.5-18047-1 otic suspension Apply 3 drops to affected ear(s) 4 times daily for 10 days. Lie with affected ear  upward x 5 min (Patient not taking: Reported on 6/10/2024) 10 mL 0    NONFORMULARY       NONFORMULARY       NONFORMULARY       NONFORMULARY       NONFORMULARY       NONFORMULARY       NONFORMULARY pridopodine      NONFORMULARY Super amino 23      NONFORMULARY Thyroid support      NONFORMULARY Lions maine powder      NONFORMULARY resveratrol      NONFORMULARY Castor oil      NONFORMULARY Dandelion root 1,575      NONFORMULARY Milk thistle      NONFORMULARY Ashwagandha powder      NONFORMULARY Taurine 1,000 mg      NONFORMULARY Curcumin 500 mg      NONFORMULARY Turmeric 500 mg      NONFORMULARY Niacin 500 mg      NONFORMULARY Co q-10 100 mg      NONFORMULARY Selenium 200 mg      NONFORMULARY Turkey tail 1.5 g      NONFORMULARY Fish oil      NONFORMULARY Iron 65 mg      NONFORMULARY Zinc picolinate 50 mg      predniSONE (DELTASONE) 20 MG tablet Take two tablets (= 40mg) each day for 4 (four) days (Patient not taking: Reported on 6/10/2024) 8 tablet 0    valACYclovir (VALTREX) 1000 mg tablet Take 1 tablet (1,000 mg) by mouth 3 times daily for 7 days 21 tablet 0    vitamin B-12 (CYANOCOBALAMIN) 2500 MCG sublingual tablet Take by mouth daily 5,000 mcg      vitamin E (TOCOPHEROL) 100 units (45 mg) capsule Take 100 Units by mouth daily      vitamin E 400 units TABS Take 400 Units by mouth daily       Current Facility-Administered Medications   Medication Dose Route Frequency Provider Last Rate Last Admin    sodium chloride (PF) 0.9% PF flush 3 mL  3 mL Intracatheter q1 min prn Arianna Post PA-C   3 mL at 12/08/23 1032               Allergies   Allergen Reactions    Amlodipine      Nauseated and headache     Hydrochlorothiazide      Nausea and headache     Care Coordination Start Date: 3/29/2024   Frequency of Care Coordination: monthly, more frequently as needed     Form Last Updated: 06/27/2024

## 2024-06-27 NOTE — NURSING NOTE
Registry/Observational Study     Patient was offered participation in the Multi-Site Natural History of ALS and other motor neuron disorders registry. The consent and HIPAA form was discussed and   and explained to the patient. It was discussed that involvement with the study is voluntary and refusal to participate would not involve penalty or decrease benefits at which the patient is entitled, and the subject may discontinue his/her involvement at any time without penalty or loss in benefits. The patient was given time to review and ask any questions about the consent. Patient verbalized understanding of the study. A copy of the signed consent was given to the patient for their records.     Subject Informed Consent and Authorization Form: SIGNED ON 6/20/2024    Multi-Site Natural History of ALS and other motor neuron disorders  PI: Chuck Coffman MD  : Lesly Lucas [email or web address]

## 2024-06-28 DIAGNOSIS — G12.21 ALS (AMYOTROPHIC LATERAL SCLEROSIS) (H): Primary | ICD-10-CM

## 2024-06-28 NOTE — CONFIDENTIAL NOTE
RECORDS RECEIVED FROM: Dr Borrego    DATE RECEIVED: 7/18/24    NOTES STATUS DETAILS   OFFICE NOTE from referring provider Internal  Jorge Fam MD      MEDICATION LIST Internal     IMAGING  (NEED IMAGES AND REPORTS)     CT SCAN     CHEST XRAY (CXR) In process      TESTS     PULMONARY FUNCTION TESTING (PFT) In process          Action 6.28.24 sv    Action Taken Message sent to nurse pool to place PFT and CXR orders

## 2024-07-02 ENCOUNTER — PATIENT OUTREACH (OUTPATIENT)
Dept: CARE COORDINATION | Facility: CLINIC | Age: 69
End: 2024-07-02
Payer: MEDICARE

## 2024-07-16 DIAGNOSIS — G12.21 ALS (AMYOTROPHIC LATERAL SCLEROSIS) (H): Primary | ICD-10-CM

## 2024-07-17 ENCOUNTER — TELEPHONE (OUTPATIENT)
Dept: PULMONOLOGY | Facility: CLINIC | Age: 69
End: 2024-07-17
Payer: MEDICARE

## 2024-07-17 NOTE — TELEPHONE ENCOUNTER
Patient Contacted for the patient to call back and schedule the following:    Appointment type: Full PFT  Provider: Salima  Return date: 7/18/2024  Specialty phone number: [phone]  Additional appointment(s) needed: full pft  Additonal Notes: ok'd per Jessie to book at 11:30 on 7/18

## 2024-07-18 ENCOUNTER — PRE VISIT (OUTPATIENT)
Dept: PULMONOLOGY | Facility: CLINIC | Age: 69
End: 2024-07-18
Payer: MEDICARE

## 2024-07-18 ENCOUNTER — OFFICE VISIT (OUTPATIENT)
Dept: PULMONOLOGY | Facility: CLINIC | Age: 69
End: 2024-07-18
Payer: MEDICARE

## 2024-07-18 ENCOUNTER — OFFICE VISIT (OUTPATIENT)
Dept: PULMONOLOGY | Facility: CLINIC | Age: 69
End: 2024-07-18
Attending: INTERNAL MEDICINE
Payer: MEDICARE

## 2024-07-18 VITALS — DIASTOLIC BLOOD PRESSURE: 85 MMHG | OXYGEN SATURATION: 99 % | SYSTOLIC BLOOD PRESSURE: 175 MMHG | HEART RATE: 53 BPM

## 2024-07-18 DIAGNOSIS — J45.40 MODERATE PERSISTENT ASTHMA WITHOUT COMPLICATION: Primary | ICD-10-CM

## 2024-07-18 DIAGNOSIS — G12.21 ALS (AMYOTROPHIC LATERAL SCLEROSIS) (H): ICD-10-CM

## 2024-07-18 DIAGNOSIS — G70.9 NEUROMUSCULAR RESPIRATORY WEAKNESS (H): ICD-10-CM

## 2024-07-18 DIAGNOSIS — J99 NEUROMUSCULAR RESPIRATORY WEAKNESS (H): ICD-10-CM

## 2024-07-18 PROCEDURE — 99205 OFFICE O/P NEW HI 60 MIN: CPT | Mod: 25 | Performed by: INTERNAL MEDICINE

## 2024-07-18 PROCEDURE — 94375 RESPIRATORY FLOW VOLUME LOOP: CPT | Performed by: INTERNAL MEDICINE

## 2024-07-18 PROCEDURE — 94726 PLETHYSMOGRAPHY LUNG VOLUMES: CPT | Performed by: INTERNAL MEDICINE

## 2024-07-18 PROCEDURE — G0463 HOSPITAL OUTPT CLINIC VISIT: HCPCS | Performed by: INTERNAL MEDICINE

## 2024-07-18 RX ORDER — BUDESONIDE 0.5 MG/2ML
0.5 INHALANT ORAL DAILY
Qty: 180 ML | Refills: 3 | Status: SHIPPED | OUTPATIENT
Start: 2024-07-18 | End: 2025-07-13

## 2024-07-18 RX ORDER — ARFORMOTEROL TARTRATE 15 UG/2ML
15 SOLUTION RESPIRATORY (INHALATION) 2 TIMES DAILY
Qty: 360 ML | Refills: 3 | Status: SHIPPED | OUTPATIENT
Start: 2024-07-18 | End: 2025-07-13

## 2024-07-18 ASSESSMENT — PAIN SCALES - GENERAL: PAINLEVEL: NO PAIN (0)

## 2024-07-18 NOTE — NURSING NOTE
Chief Complaint   Patient presents with    New Patient     New pulm      Vitals were taken and medications were reconciled.    Sharee Valdes RMA  1:02 PM
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
## 2024-07-19 LAB
ERV-%PRED-PRE: 36 %
ERV-PRE: 0.53 L
ERV-PRED: 1.42 L
EXPTIME-PRE: 7.68 SEC
FEF2575-%PRED-PRE: 76 %
FEF2575-PRE: 1.8 L/SEC
FEF2575-PRED: 2.36 L/SEC
FEFMAX-%PRED-PRE: 65 %
FEFMAX-PRE: 5.41 L/SEC
FEFMAX-PRED: 8.31 L/SEC
FEV1-%PRED-PRE: 97 %
FEV1-PRE: 2.9 L
FEV1FEV6-PRE: 70 %
FEV1FEV6-PRED: 78 %
FEV1FVC-PRE: 69 %
FEV1FVC-PRED: 77 %
FEV1SVC-PRE: 99 %
FEV1SVC-PRED: 71 %
FIFMAX-PRE: 1.63 L/SEC
FRCPLETH-%PRED-PRE: 87 %
FRCPLETH-PRE: 3.17 L
FRCPLETH-PRED: 3.61 L
FVC-%PRED-PRE: 108 %
FVC-PRE: 4.17 L
FVC-PRED: 3.83 L
IC-%PRED-PRE: 84 %
IC-PRE: 2.4 L
IC-PRED: 2.84 L
MEP-PRE: 29 CMH2O
MIP-PRE: -15 CMH2O
RVPLETH-%PRED-PRE: 103 %
RVPLETH-PRE: 2.64 L
RVPLETH-PRED: 2.55 L
TLCPLETH-%PRED-PRE: 80 %
TLCPLETH-PRE: 5.57 L
TLCPLETH-PRED: 6.88 L
VC-%PRED-PRE: 70 %
VC-PRE: 2.93 L
VC-PRED: 4.14 L

## 2024-07-22 ENCOUNTER — PRE VISIT (OUTPATIENT)
Dept: OTOLARYNGOLOGY | Facility: CLINIC | Age: 69
End: 2024-07-22

## 2024-07-23 ENCOUNTER — TELEPHONE (OUTPATIENT)
Facility: CLINIC | Age: 69
End: 2024-07-23
Payer: MEDICARE

## 2024-07-23 NOTE — TELEPHONE ENCOUNTER
Prior Authorization Specialty Medication Request    Medication/Dose: Budesonide (PULMICORT) 0.5 MG/2ML neb solution  Diagnosis and ICD code (if different than what is on RX):    New/renewal/insurance change PA/secondary ins. PA:  Previously Tried and Failed:      Important Lab Values:   Rationale:     Insurance   Primary:   Insurance ID:      Secondary (if applicable):  Insurance ID:      Pharmacy Information (if different than what is on RX)  Name:    Phone:    Fax:

## 2024-07-25 NOTE — TELEPHONE ENCOUNTER
PA Initiation    Medication: BUDESONIDE 0.5 MG/2ML IN SUSP  Insurance Company: [employer] - Phone [phone] Fax [phone]  [id number] [place], [place] - [address] [address]  Filling Pharmacy Phone: [phone]  Filling Pharmacy Fax: [phone]  Start Date: 7/25/2024

## 2024-07-26 NOTE — TELEPHONE ENCOUNTER
RN spoke with pharmacy and inform to bill under part B. Pharmacy will bill and notify if it does not work.     -Qi RN

## 2024-07-26 NOTE — TELEPHONE ENCOUNTER
PRIOR AUTHORIZATION DENIED    Medication: BUDESONIDE 0.5 MG/2ML IN SUSP  Insurance Company: [employer] - Phone [phone] Fax [phone]  Denial Date: 7/26/2024  Denial Reason(s): Must bill Part B    Appeal Information: N/A  Patient Notified: No, care team must notify on denials.

## 2024-07-29 ENCOUNTER — PATIENT OUTREACH (OUTPATIENT)
Dept: CARE COORDINATION | Facility: CLINIC | Age: 69
End: 2024-07-29
Payer: MEDICARE

## 2024-07-29 NOTE — PROGRESS NOTES
"Clinic Care Coordination Contact  Community Health Worker Follow Up    Care Gaps:     Health Maintenance Due   Topic Date Due    ASTHMA ACTION PLAN  Never done    ASTHMA CONTROL TEST  Never done    Pneumococcal Vaccine: 65+ Years (1 of 2 - PCV) Never done    COLORECTAL CANCER SCREENING  Never done    DTAP/TDAP/TD IMMUNIZATION (1 - Tdap) Never done    ZOSTER IMMUNIZATION (1 of 2) Never done    LUNG CANCER SCREENING  Never done    RSV VACCINE (Pregnancy & 60+) (1 - 1-dose 60+ series) Never done    MEDICARE ANNUAL WELLNESS VISIT  Never done    AORTIC ANEURYSM SCREENING (SYSTEM ASSIGNED)  Never done    ADVANCE CARE PLANNING  08/29/2023    COVID-19 Vaccine (1 - 2023-24 season) Never done     Not addressed at this time as due to patient's significant other's limited time.     Care Gap Goal set: No and Declined due to patient's significant other being \"very busy\" at the time.      Care Plan:   Care Plan: Financial Resource Worker       Problem: Financial Resource Worker       Goal: Financial Resource Worker       Start Date: 4/8/2024 Expected End Date: 10/7/2024    This Visit's Progress: 90% Recent Progress: 90%    Note:     Barriers: Early bulbar ALS; Limited income; Provider availability - wait time to complete appointments.   Strengths: Motivated; Agreeable to Care Coordination.   Patient expressed understanding of goal: Yes.     Action steps to achieve this goal:  1. I will respond to the Financial Resource Worker (FRW). (7/29/24 Update: Ongoing)  2. I will return calls from the FRW promptly in order to complete assessments timely. (7/29/24 Update: Ongoing)  3. I will contact my care team with questions, concerns or support needs. I will use the clinic as a resource and I understand I can contact my clinic with 24/7 after hours services available. Care Coordinator will remain available as needed. (7/29/24 Update: Ongoing)  4. I will wait to hear from [facility] FRW again. (7/29/24 Update: Ongoing)                        " "    Intervention and Education during outreach:     CHW spoke with patient's significant other Hanane who stated patient is \"dying\" and she is \"very busy.\"     CHW communicated to Hanane that FRW will be contacting patient/her to verify all of his outstanding and pending claims are reproccessed on 8/1/24. She stated that patient/she need further financial support/resources from FRW. She also reported that patient/she have a very important meeting/call at 12pm on 8/1/24 and would like to receive the FRW call in morning.     Per patient/significant other's request, CHW provided the phone number of FRW assigned to the case (Erin Telles MA).     CHW Plan: Next CHW follow-up outreach in one month.     Sissy Kinsey  Community Health Worker   [email or web address]   Clinic Care Coordination / Ambulatory Care Management  [facility]  [email or web address]  Office: [phone]  Gender Pronouns: She/her/hers  Employed by [facility] [employer]    "

## 2024-08-01 ENCOUNTER — PATIENT OUTREACH (OUTPATIENT)
Dept: CARE COORDINATION | Facility: CLINIC | Age: 69
End: 2024-08-01
Payer: MEDICARE

## 2024-08-07 ENCOUNTER — PATIENT OUTREACH (OUTPATIENT)
Dept: CARE COORDINATION | Facility: CLINIC | Age: 69
End: 2024-08-07
Payer: MEDICARE

## 2024-08-07 NOTE — PROGRESS NOTES
Social Work Telephone Note  [facility]     Patient Name:  Rajeev Montalvo  /Age:  1955 (68 year old)    Referral Source: patient Hanane ABDALLA  Reason for Referral:  financial support     contacted Patient's Hanane ABDALLA via telephone on 24, following a message that writer had received from Hanane. Hanane shared that she had previously spoken with Connie and was guided to call her back once Rajeev bills had all arrived and they could connect regarding [employer].   Hanane wasn't sure who she had recently spoken with, but had been told that there wasn't anything they could do to help her. When questioned, Hanane was unsure if she had already filed for [employer]an [employer], but could confirm that she had spoken to someone about finances but just couldn't keep it all straight. Upon chart review, noted that FRW had connected family and Hanane wasn't able to talk at that time. Hanane was also unsure if they had applied MA.    Writer reached out to FRW that had recently contacted family to better understand what had been done for this family. PAZ Metzger, is planning to reach out to Hanane again and assist with financial concerns.    Jabier will continue to assist as able.           PEYTON Coburn, [facility]    [facility]  [phone]  [email or web address]

## 2024-08-08 ENCOUNTER — PATIENT OUTREACH (OUTPATIENT)
Dept: CARE COORDINATION | Facility: CLINIC | Age: 69
End: 2024-08-08
Payer: MEDICARE

## 2024-08-08 NOTE — PROGRESS NOTES
Clinic Care Coordination Contact  Care Coordination Clinician Chart Review    Situation: Patient chart reviewed by Care Coordinator.       Background: Care Coordination Program started: 3/29/2024. Initial assessment completed and patient-centered care plan(s) were developed with participation from patient. Lead CC handed patient off to CHW for continued outreaches.       Assessment: Per chart review, patient outreach completed by CC CHW on 7/29/24.  Patient is actively working to accomplish goal(s). Patient's goal(s) appropriate and relevant at this time. Patient is not due for updated Plan of Care.  Assessments will be completed annually or as needed/with change of patient status.      Care Plan: Financial Resource Worker       Problem: Financial Resource Worker       Goal: Financial Resource Worker       Start Date: 4/8/2024 Expected End Date: 10/7/2024    This Visit's Progress: 90% Recent Progress: 90%    Note:     Barriers: Early bulbar ALS; Limited income; Provider availability - wait time to complete appointments.   Strengths: Motivated; Agreeable to Care Coordination.   Patient expressed understanding of goal: Yes.     Action steps to achieve this goal:  1. I will respond to the Financial Resource Worker (FRW). (7/29/24 Update: Ongoing)  2. I will return calls from the FRW promptly in order to complete assessments timely. (7/29/24 Update: Ongoing)  3. I will contact my care team with questions, concerns or support needs. I will use the clinic as a resource and I understand I can contact my clinic with 24/7 after hours services available. Care Coordinator will remain available as needed. (7/29/24 Update: Ongoing)  4. I will wait to hear from [facility] FRW again. (7/29/24 Update: Ongoing)                                 Plan/Recommendations: The patient will continue working with Care Coordination to achieve goal(s) as above. CHW will continue outreaches at minimum every 30 days and will involve Lead CC as needed or  if patient is ready to move to Maintenance. Lead CC will continue to monitor CHW outreaches and patient's progress to goal(s) every 6 weeks.     Plan of Care updated and sent to patient: Emelia Telles RN, BSN, CPHN, CCM  [facility] Ambulatory Care Management  [facility]  Phone: [phone]  Email: [email or web address]

## 2024-08-09 ENCOUNTER — PATIENT OUTREACH (OUTPATIENT)
Dept: CARE COORDINATION | Facility: CLINIC | Age: 69
End: 2024-08-09
Payer: MEDICARE

## 2024-09-02 ENCOUNTER — HOSPITAL ENCOUNTER (EMERGENCY)
Facility: CLINIC | Age: 69
Discharge: HOME | End: 2024-09-02
Attending: EMERGENCY MEDICINE | Admitting: EMERGENCY MEDICINE
Payer: MEDICARE

## 2024-09-02 VITALS
RESPIRATION RATE: 28 BRPM | TEMPERATURE: 97 F | HEIGHT: 67 IN | DIASTOLIC BLOOD PRESSURE: 87 MMHG | SYSTOLIC BLOOD PRESSURE: 183 MMHG | BODY MASS INDEX: 28.96 KG/M2 | WEIGHT: 184.53 LBS | HEART RATE: 57 BPM | OXYGEN SATURATION: 97 %

## 2024-09-02 DIAGNOSIS — I10 HYPERTENSION, UNSPECIFIED TYPE: ICD-10-CM

## 2024-09-02 DIAGNOSIS — R06.02 SOB (SHORTNESS OF BREATH): ICD-10-CM

## 2024-09-02 DIAGNOSIS — G12.21 ALS (AMYOTROPHIC LATERAL SCLEROSIS) (H): ICD-10-CM

## 2024-09-02 PROCEDURE — 93005 ELECTROCARDIOGRAM TRACING: CPT

## 2024-09-02 PROCEDURE — 99291 CRITICAL CARE FIRST HOUR: CPT

## 2024-09-02 PROCEDURE — 94660 CPAP INITIATION&MGMT: CPT

## 2024-09-02 PROCEDURE — 99283 EMERGENCY DEPT VISIT LOW MDM: CPT

## 2024-09-02 PROCEDURE — 999N000157 HC STATISTIC RCP TIME EA 10 MIN

## 2024-09-02 ASSESSMENT — ACTIVITIES OF DAILY LIVING (ADL)
ADLS_ACUITY_SCORE: 35

## 2024-09-02 ASSESSMENT — COLUMBIA-SUICIDE SEVERITY RATING SCALE - C-SSRS
2. HAVE YOU ACTUALLY HAD ANY THOUGHTS OF KILLING YOURSELF IN THE PAST MONTH?: NO
6. HAVE YOU EVER DONE ANYTHING, STARTED TO DO ANYTHING, OR PREPARED TO DO ANYTHING TO END YOUR LIFE?: NO
1. IN THE PAST MONTH, HAVE YOU WISHED YOU WERE DEAD OR WISHED YOU COULD GO TO SLEEP AND NOT WAKE UP?: NO

## 2024-09-02 NOTE — PROGRESS NOTES
RT Note:    A BiPAP of 10/5 @ 30% was applied to the pt via the mask for an increase in WOB and/or SOB. SpO2 %, so FiO2 weaned to 21%. Skin integrity on the bridge of the nose is clean, dry, and intact; plan is to change interface from full face mask to under the nose mask Q4 hours. Patient is currently tolerating BiPAP well. On current settings, RR 20, Vt 550, and SpO2 99%.    Patient states he has a BiPAP at home, but feels the settings on the home BiPAP are too high and he is unable to tolerate wearing his home BiPAP on its current settings.

## 2024-09-02 NOTE — ED NOTES
Writer entered room to perform EKG. Pt was removing biPAP when entering room. Writer informed pt that the provider wanted him to leave the biPAP on. Pt refused. Pt was agreeable to wear the oxymask at 4 L. Pt given 2 ice chips per Dr Ruffin's approval. Dasha OLIVAREZ informed.
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
## 2024-09-02 NOTE — ED NOTES
Ambulated pt down ED hallway approximately 100 feet. Pt stated they did not feel dizzy or SOB during ambulation. Pt had a steady gait that is normal for them. Pt's sats stayed in the upper 90s during ambulation.

## 2024-09-02 NOTE — DISCHARGE INSTRUCTIONS
.  As we discussed please use your BiPAP to help with your breathing      Discharge Instructions  Hypertension - High Blood Pressure    During you visit to the Emergency Department, your blood pressure was higher than the recommended blood pressure.  This may be related to stress, pain, medication or other temporary conditions. In these cases, your blood pressure may return to normal on its own. If you have a history of high blood pressure, you may need to have your doctor adjust your medications. Sometimes, your high measurement here may indicate that you have developed high blood pressure that will stay high unless it is treated. Sudden very high blood pressure can cause problems, but usually high blood pressure causes problems over months to years.      Blood pressure is almost never lowered in the Emergency Department, because studies have shown that lowering blood pressure too quickly is much more dangerous than leaving it alone.    You need to follow up with your doctor in 1-3 days to get your blood pressure rechecked.     Return to the Emergency Department if you start to have:  A severe headache.  Chest pain.  Shortness of breath.  Weakness or numbness that affects one part of the body.  Confusion.  Vision changes.  Significant swelling of legs and/or eyes.  A reaction to any medication started in the Emergency Department.    What can I do to help myself?  Avoid alcohol.  Take any blood pressure medicine that you are prescribed.  Get a good night s sleep.  Lower your salt intake.  Exercise.  Lose weight.  Manage stress.    If blood pressure medication was started in the Emergency Department:  The medicine may not have an immediate effect. The body and brain determine what blood pressure you have. The medicine s job is to retrain the body s  thermostat  to a lower blood pressure.  You will need to follow up with your doctor to see how this medicine is working for you.  If you were given a prescription for  medicine here today, be sure to read all of the information (including the package insert) that comes with your prescription.  This will include important information about the medicine, its side effects, and any warnings that you need to know about.  The pharmacist who fills the prescription can provide more information and answer questions you may have about the medicine.  If you have questions or concerns that the pharmacist cannot address, please call or return to the Emergency Department.   Opioid Medication Information    Pain medications are among the most commonly prescribed medicines, so we are including this information for all our patients. If you did not receive pain medication or get a prescription for pain medicine, you can ignore it.     You may have been given a prescription for an opioid (narcotic) pain medicine and/or have received a pain medicine while here in the Emergency Department. These medicines can make you drowsy or impaired. You must not drive, operate dangerous equipment, or engage in any other dangerous activities while taking these medications. If you drive while taking these medications, you could be arrested for DUI, or driving under the influence. Do not drink any alcohol while you are taking these medications.     Opioid pain medications can cause addiction. If you have a history of chemical dependency of any type, you are at a higher risk of becoming addicted to pain medications.  Only take these prescribed medications to treat your pain when all other options have been tried. Take it for as short a time and as few doses as possible. Store your pain pills in a secure place, as they are frequently stolen and provide a dangerous opportunity for children or visitors in your house to start abusing these powerful medications. We will not replace any lost or stolen medicine.  As soon as your pain is better, you should flush all your remaining medication.     Many prescription pain  medications contain Tylenol  (acetaminophen), including Vicodin , Tylenol #3 , Norco , Lortab , and Percocet .  You should not take any extra pills of Tylenol  if you are using these prescription medications or you can get very sick.  Do not ever take more than 3000 mg of acetaminophen in any 24 hour period.    All opioids tend to cause constipation. Drink plenty of water and eat foods that have a lot of fiber, such as fruits, vegetables, prune juice, apple juice and high fiber cereal.  Take a laxative if you don t move your bowels at least every other day. Miralax , Milk of Magnesia, Colace , or Senna  can be used to keep you regular.      Remember that you can always come back to the Emergency Department if you are not able to see your regular doctor in the amount of time listed above, if you get any new symptoms, or if there is anything that worries you.

## 2024-09-03 ENCOUNTER — PATIENT OUTREACH (OUTPATIENT)
Dept: CARE COORDINATION | Facility: CLINIC | Age: 69
End: 2024-09-03
Payer: MEDICARE

## 2024-09-03 LAB
ATRIAL RATE - MUSE: 56 BPM
DIASTOLIC BLOOD PRESSURE - MUSE: NORMAL MMHG
INTERPRETATION ECG - MUSE: NORMAL
P AXIS - MUSE: 7 DEGREES
PR INTERVAL - MUSE: 154 MS
QRS DURATION - MUSE: 86 MS
QT - MUSE: 420 MS
QTC - MUSE: 405 MS
R AXIS - MUSE: 7 DEGREES
SYSTOLIC BLOOD PRESSURE - MUSE: NORMAL MMHG
T AXIS - MUSE: 51 DEGREES
VENTRICULAR RATE- MUSE: 56 BPM

## 2024-09-03 NOTE — PROGRESS NOTES
Clinic Care Coordination Contact    Situation: Patient chart reviewed by care coordinator.    Background: Patient enrolled in Care Coordination and followed by CHW and RN CC.     Assessment: Patient seen at [facility] ED for shortness of breath. He was able to come off BiPap. Patient refused labs. Provider recommended admission. Patient wanted to go home. He was discharged to home in stable condition.     Plan/Recommendations: No further action needed by Care Coordination.     Radha Telles RN, BSN, CPHN, CCM  [facility] Ambulatory Care Management  [facility]  Phone: [phone]  Email: [email or web address]

## 2024-09-06 ENCOUNTER — PATIENT OUTREACH (OUTPATIENT)
Dept: CARE COORDINATION | Facility: CLINIC | Age: 69
End: 2024-09-06
Payer: MEDICARE

## 2024-09-06 NOTE — PROGRESS NOTES
Clinic Care Coordination Contact  Community Health Worker Follow Up    Care Gaps:     Health Maintenance Due   Topic Date Due    ASTHMA ACTION PLAN  Never done    ASTHMA CONTROL TEST  Never done    Pneumococcal Vaccine: 65+ Years (1 of 2 - PCV) Never done    COLORECTAL CANCER SCREENING  Never done    DTAP/TDAP/TD IMMUNIZATION (1 - Tdap) Never done    ZOSTER IMMUNIZATION (1 of 2) Never done    LUNG CANCER SCREENING  Never done    RSV VACCINE (1 - 1-dose 60+ series) Never done    MEDICARE ANNUAL WELLNESS VISIT  Never done    AORTIC ANEURYSM SCREENING (SYSTEM ASSIGNED)  Never done    ADVANCE CARE PLANNING  08/29/2023    INFLUENZA VACCINE (1) 09/01/2024    COVID-19 Vaccine (1 - 2023-24 season) Never done     Care Gap Goal set: No and Declined due to patient's partner Hanane's request.      Care Plan:   Care Plan: Financial Resource Worker       Problem: Financial Resource Worker       Goal: Financial Resource Worker       Start Date: 4/8/2024 Expected End Date: 10/7/2024    This Visit's Progress: 90% Recent Progress: 90%    Note:     Barriers: Early bulbar ALS; Limited income; Provider availability - wait time to complete appointments.   Strengths: Motivated; Agreeable to Care Coordination.   Patient expressed understanding of goal: Yes.     Action steps to achieve this goal:  1. I will respond to the Financial Resource Worker (FRW). (9/6/24 Update: Ongoing)  2. I will return calls from the FRW promptly in order to complete assessments timely. (9/6/24 Update: Ongoing)  3. I will contact my care team with questions, concerns or support needs. I will use the clinic as a resource and I understand I can contact my clinic with 24/7 after hours services available. Care Coordinator will remain available as needed. (9/6/24 Update: Ongoing)  4. I will wait to hear from [facility] FRW again. (9/6/24 Update: Ongoing)                            Intervention and Education during outreach:     CHW spoke with patient's partner Hanane (C2C on  file).     Per chart review on patient's FRW cases:     [facility] Care: Approved on 05/08/2024 (10/02/2019 - 11/08/2024) for 100% CC.    County Benefit (SNAP/CASH): Approved on 06/05/2024.    Medicaid (Certain Populations): An eligibility query was sent for MEDICAID [place] on 9/2/24 (Pending). CHW informed patient's partner Hanane that FRW will be in touch with patient/her with update later this month.     No other need was identified or mentioned during this CHW outreach call.     CHW Plan: Next CHW follow-up outreach in one month.

## 2024-09-13 ENCOUNTER — PATIENT OUTREACH (OUTPATIENT)
Dept: CARE COORDINATION | Facility: CLINIC | Age: 69
End: 2024-09-13
Payer: MEDICARE

## 2024-09-19 ENCOUNTER — PATIENT OUTREACH (OUTPATIENT)
Dept: CARE COORDINATION | Facility: CLINIC | Age: 69
End: 2024-09-19
Payer: MEDICARE

## 2024-09-19 NOTE — LETTER
Dear Rajeev,   Attached is an updated Patient Centered Plan of Care for your continued enrollment in Care Coordination. Please let us know if you have additional questions, concerns or goals that we can assist with.     Sincerely,   Radha Telles RN, BSN, CPHN, CM  [facility] Ambulatory Care Management  [facility]  Phone: [phone]  Email: [email or web address]  Patient Centered Plan of Care  About Me:        Patient Name:  Rajeev Montalvo    YOB: 1955  Age:         68 year old   [place] MRN:    [id number] Telephone Information:  Home Phone [phone]   Mobile [phone]       Address:  [address] [place] [id number] Email address:  [email or web address]      Emergency Contact(s)    Name Relationship Lgl Grd Work Phone Home Phone Mobile Phone   1. TESS SANCHEZ Significant ot*    [phone]   2. VAMSI CAVAZOS* Friend    [phone]   3. VICKIE CATHERINE Friend    [phone]           Primary language:  English     needed? No   [place] [employer] [facility]:  [phone] op. 1  Other communication barriers:Physical impairment (Bulbar ALS)    Preferred Method of Communication:  Mail  Current living arrangement: I live in a private home with spouse    Mobility Status/ Medical Equipment: Independent    Health Maintenance  Health Maintenance Reviewed: Due/Overdue (Discussed with patient.)      My Access Plan  Medical Emergency 911   Primary Clinic Line  - No primary care provider on file   24 Hour Appointment Line [phone] or  [phone]) (toll-free)   24 Hour Nurse Line [phone] (toll-free)   Preferred Urgent Care No data recorded   Preferred Hospital [facility]  [phone]     Preferred Pharmacy [id number] - Closed - [place], [place] - [address]     Behavioral Health Crisis Line The National Suicide Prevention Lifeline at 1-481.763.7064  or Text/Call 988     My Care Team Members  Patient Care Team         Relationship Specialty Notifications Start End    Radha Telles, RN Lead Care Coordinator Primary Care - CC Admissions 3/29/24     Phone: [phone]         Erin Telles MA Financial Resource Worker   4/9/24     Phone: [phone]         Jorge Jameson MD Assigned Neuroscience Provider   6/23/24     Phone: [phone] Fax: [phone]         [address] [facility] [id number] [facility] [address]    Sissy Kinsey CHW Community Health Worker  Admissions 7/12/24     Iliana Borrego MD Assigned Pulmonology Provider   7/23/24     Phone: [phone] Fax: [phone] [address] [facility] [address]              My Care Plans  Self Management and Treatment Plan    Care Plan  Care Plan: Financial Resource Worker       Problem: Financial Resource Worker       Goal: Financial Resource Worker       Start Date: 4/8/2024 Expected End Date: 10/7/2024    This Visit's Progress: 90% Recent Progress: 90%    Note:     Barriers: Early bulbar ALS; Limited income; Provider availability - wait time to complete appointments.   Strengths: Motivated; Agreeable to Care Coordination.   Patient expressed understanding of goal: Yes.     Action steps to achieve this goal:  1. I will respond to the Financial Resource Worker (FRW). (9/6/24 Update: Ongoing)  2. I will return calls from the FRW promptly in order to complete assessments timely. (9/6/24 Update: Ongoing)  3. I will contact my care team with questions, concerns or support needs. I will use the clinic as a resource and I understand I can contact my clinic with 24/7 after hours services available. Care Coordinator will remain available as needed. (9/6/24 Update: Ongoing)  4. I will wait to hear from [facility] FRW again. (9/6/24 Update: Ongoing)                            Action Plans on File:     Advance Care Plans/Directives:   Advanced Care Plan/Directives on file:   No    Discussed with  patient/caregiver(s):   Declined Further Information           My Medical and Care Information  Problem List   Patient Active Problem List   Diagnosis    HTN (hypertension)    PTSD (post-traumatic stress disorder)    HSV (herpes simplex virus) infection    Hiatal hernia    Gastroesophageal reflux disease with esophagitis    Speech abnormality      Current Medications and Allergies:   Current Outpatient Medications   Medication Sig Dispense Refill    albuterol (PROAIR HFA/PROVENTIL HFA/VENTOLIN HFA) 108 (90 Base) MCG/ACT inhaler Inhale 2 puffs into the lungs every 6 hours as needed for shortness of breath, wheezing or cough (Patient not taking: Reported on 6/10/2024) 54 g 1    albuterol (PROVENTIL) (2.5 MG/3ML) 0.083% neb solution Take 1 vial (2.5 mg) by nebulization every 6 hours as needed for shortness of breath, wheezing or cough (Patient not taking: Reported on 6/10/2024) 90 mL 0    aminocaproic acid (AMICAR) 0.25 GM/ML solution Take by mouth every 6 hours (Patient not taking: Reported on 7/18/2024)      amLODIPine (NORVASC) 10 MG tablet TAKE 1 TABLET BY MOUTH DAILY. (Patient not taking: Reported on 12/8/2023) 90 tablet 3    amoxicillin-clavulanate (AUGMENTIN) 875-125 MG tablet Take 1 tablet by mouth 2 times daily (Patient not taking: Reported on 6/10/2024) 20 tablet 0    arformoterol (BROVANA) 15 MCG/2ML NEBU neb solution Take 2 mLs (15 mcg) by nebulization 2 times daily for 360 days 360 mL 3    betaine (TMG) 500 MG CAPS capsule  (Patient not taking: Reported on 7/18/2024)      budesonide (PULMICORT) 0.5 MG/2ML neb solution Take 2 mLs (0.5 mg) by nebulization daily for 360 days 180 mL 3    dextromethorphan-quiNIDine (NUEDEXTA) 20-10 MG capsule 1 capsule daily for 1 week, then 1 capsule twice a day thereafter (Patient not taking: Reported on 7/18/2024) 60 capsule 2    Flaxseed, Linseed, (FLAX SEED OIL) 1000 MG capsule Take 1 capsule by mouth daily (Patient not taking: Reported on 7/18/2024)      magnesium 250 MG  tablet Take 1 tablet by mouth daily (Patient not taking: Reported on 7/18/2024)      magnesium oxide 400 MG CAPS  (Patient not taking: Reported on 7/18/2024)      neomycin-polymyxin-hydrocortisone (CORTISPORIN) 3.5-62321-3 otic suspension Apply 3 drops to affected ear(s) 4 times daily for 10 days. Lie with affected ear upward x 5 min (Patient not taking: Reported on 6/10/2024) 10 mL 0    NONFORMULARY  (Patient not taking: Reported on 7/18/2024)      NONFORMULARY  (Patient not taking: Reported on 7/18/2024)      NONFORMULARY  (Patient not taking: Reported on 7/18/2024)      NONFORMULARY  (Patient not taking: Reported on 7/18/2024)      NONFORMULARY  (Patient not taking: Reported on 7/18/2024)      NONFORMULARY  (Patient not taking: Reported on 7/18/2024)      NONFORMULARY pridopodine (Patient not taking: Reported on 7/18/2024)      NONFORMULARY Super amino 23 (Patient not taking: Reported on 7/18/2024)      NONFORMULARY Thyroid support (Patient not taking: Reported on 7/18/2024)      NONFORMULARY Lions maine powder (Patient not taking: Reported on 7/18/2024)      NONFORMULARY resveratrol (Patient not taking: Reported on 7/18/2024)      NONFORMULARY Castor oil (Patient not taking: Reported on 7/18/2024)      NONFORMULARY Dandelion root 1,575 (Patient not taking: Reported on 7/18/2024)      NONFORMULARY Milk thistle (Patient not taking: Reported on 7/18/2024)      NONFORMULARY Ashwagandha powder (Patient not taking: Reported on 7/18/2024)      NONFORMULARY Taurine 1,000 mg (Patient not taking: Reported on 7/18/2024)      NONFORMULARY Curcumin 500 mg (Patient not taking: Reported on 7/18/2024)      NONFORMULARY Turmeric 500 mg (Patient not taking: Reported on 7/18/2024)      NONFORMULARY Niacin 500 mg (Patient not taking: Reported on 7/18/2024)      NONFORMULARY Co q-10 100 mg (Patient not taking: Reported on 7/18/2024)      NONFORMULARY Selenium 200 mg (Patient not taking: Reported on 7/18/2024)      NONFORMULARY  Turkey tail 1.5 g (Patient not taking: Reported on 7/18/2024)      NONFORMULARY Fish oil (Patient not taking: Reported on 7/18/2024)      NONFORMULARY Iron 65 mg (Patient not taking: Reported on 7/18/2024)      NONFORMULARY Zinc picolinate 50 mg (Patient not taking: Reported on 7/18/2024)      predniSONE (DELTASONE) 20 MG tablet Take two tablets (= 40mg) each day for 4 (four) days (Patient not taking: Reported on 6/10/2024) 8 tablet 0    valACYclovir (VALTREX) 1000 mg tablet Take 1 tablet (1,000 mg) by mouth 3 times daily for 7 days 21 tablet 0    vitamin B-12 (CYANOCOBALAMIN) 2500 MCG sublingual tablet Take by mouth daily 5,000 mcg (Patient not taking: Reported on 7/18/2024)      vitamin E (TOCOPHEROL) 100 units (45 mg) capsule Take 100 Units by mouth daily (Patient not taking: Reported on 7/18/2024)      vitamin E 400 units TABS Take 400 Units by mouth daily (Patient not taking: Reported on 7/18/2024)       Current Facility-Administered Medications   Medication Dose Route Frequency Provider Last Rate Last Admin    sodium chloride (PF) 0.9% PF flush 3 mL  3 mL Intracatheter q1 min prn Arianna Post PA-C   3 mL at 12/08/23 1032      No Known Allergies    Care Coordination Start Date: 3/29/2024   Frequency of Care Coordination: monthly, more frequently as needed     Form Last Updated: 09/19/2024

## 2024-09-19 NOTE — PROGRESS NOTES
Clinic Care Coordination Contact  Care Coordination Clinician Chart Review    Situation: Patient chart reviewed by Care Coordinator.       Background: Care Coordination Program started: 3/29/2024. Initial assessment completed and patient-centered care plan(s) were developed with participation from patient. Lead CC handed patient off to CHW for continued outreaches.       Assessment: Per chart review, patient outreach completed by CC CHW on 9/6/24.  Patient is actively working to accomplish goal(s). Patient's goal(s) appropriate and relevant at this time. Patient is due for updated Plan of Care.  Assessments will be completed annually or as needed/with change of patient status.      Care Plan: Financial Resource Worker       Problem: Financial Resource Worker       Goal: Financial Resource Worker       Start Date: 4/8/2024 Expected End Date: 10/7/2024    This Visit's Progress: 90% Recent Progress: 90%    Note:     Barriers: Early bulbar ALS; Limited income; Provider availability - wait time to complete appointments.   Strengths: Motivated; Agreeable to Care Coordination.   Patient expressed understanding of goal: Yes.     Action steps to achieve this goal:  1. I will respond to the Financial Resource Worker (FRW). (9/6/24 Update: Ongoing)  2. I will return calls from the FRW promptly in order to complete assessments timely. (9/6/24 Update: Ongoing)  3. I will contact my care team with questions, concerns or support needs. I will use the clinic as a resource and I understand I can contact my clinic with 24/7 after hours services available. Care Coordinator will remain available as needed. (9/6/24 Update: Ongoing)  4. I will wait to hear from [facility] FRW again. (9/6/24 Update: Ongoing)                               Plan/Recommendations: The patient will continue working with Care Coordination to achieve goal(s) as above. CHW will continue outreaches at minimum every 30 days and will involve Lead CC as needed or if patient  is ready to move to Maintenance. Lead CC will continue to monitor CHW outreaches and patient's progress to goal(s) every 6 weeks.     Plan of Care updated and sent to patient: Yes, via [email or web address].    Radha Telles RN, BSN, CPHN, CCM  [facility]  [facility]  Phone: [phone]  Email: [email or web address]

## 2024-09-24 ENCOUNTER — PATIENT OUTREACH (OUTPATIENT)
Dept: NEUROLOGY | Facility: CLINIC | Age: 69
End: 2024-09-24
Payer: MEDICARE

## 2024-09-24 NOTE — PROGRESS NOTES
ALS Care Coordination - Outreach Note    REASON FOR CONTACT:   Clinic Care Coordination - Follow-up (pre-visit)       ASSESSMENT:       9/24/2024   Since last visit   ALS Care Coordination Enrollment Enrolled   Registered with ALSA Yes   Key event: Hospitalized, skin breakdown, fall? Hospitalized       ED for SOB-not using BiPAP. Was encouraged to do so by hospital staff. Caregiver reports using more frequently since then but not routine use.   Non-invasive ventilation (NIV) status: Discussed       Needs to discuss routine use of BiPAP.   Weight status: Stable       has not been wieghed but no noticeable weight loss   Bulbar symptoms: Any new or worse? Difficulty swallowing       eating slower and less due to increasing dysphagia   Communication: Any new issues? Difficulty speaking or difficulty being understood by others       speech has worsened   Gross motor and mobility: Any new concerns? No concerns       Physically active   Medication: Taking riluzole, Radicava, or Relyvrio? No, none of these       caregiver reports pt is taking numerous supplements   Medication: Taking Mucinex or Robinul? No   Home care: Equipment used at home? BiPAP   Home care: Medical equipment company? [employer]         PLAN:   - Patient to follow up in clinic as scheduled.      Lindsey Cadena LPN  ALS Neurology Care Coordinator  [facility] Neuro[facility] Service Line

## 2024-09-25 DIAGNOSIS — G12.21 ALS (AMYOTROPHIC LATERAL SCLEROSIS) (H): Primary | ICD-10-CM

## 2024-09-26 ENCOUNTER — ALLIED HEALTH/NURSE VISIT (OUTPATIENT)
Dept: NEUROLOGY | Facility: CLINIC | Age: 69
End: 2024-09-26

## 2024-09-26 ENCOUNTER — THERAPY VISIT (OUTPATIENT)
Dept: SPEECH THERAPY | Facility: CLINIC | Age: 69
End: 2024-09-26
Payer: MEDICARE

## 2024-09-26 ENCOUNTER — PATIENT OUTREACH (OUTPATIENT)
Dept: CARE COORDINATION | Facility: CLINIC | Age: 69
End: 2024-09-26

## 2024-09-26 ENCOUNTER — OFFICE VISIT (OUTPATIENT)
Dept: NEUROLOGY | Facility: CLINIC | Age: 69
End: 2024-09-26
Payer: MEDICARE

## 2024-09-26 ENCOUNTER — OFFICE VISIT (OUTPATIENT)
Dept: PULMONOLOGY | Facility: CLINIC | Age: 69
End: 2024-09-26
Payer: MEDICARE

## 2024-09-26 VITALS
HEART RATE: 51 BPM | HEIGHT: 68 IN | WEIGHT: 172.8 LBS | DIASTOLIC BLOOD PRESSURE: 90 MMHG | OXYGEN SATURATION: 99 % | RESPIRATION RATE: 16 BRPM | BODY MASS INDEX: 26.19 KG/M2 | SYSTOLIC BLOOD PRESSURE: 174 MMHG

## 2024-09-26 DIAGNOSIS — G12.21 ALS (AMYOTROPHIC LATERAL SCLEROSIS) (H): Primary | ICD-10-CM

## 2024-09-26 DIAGNOSIS — R13.12 OROPHARYNGEAL DYSPHAGIA: ICD-10-CM

## 2024-09-26 DIAGNOSIS — G12.21 ALS (AMYOTROPHIC LATERAL SCLEROSIS) (H): ICD-10-CM

## 2024-09-26 DIAGNOSIS — G12.20 FTD WITH MND (FRONTOTEMPORAL DEMENTIA WITH MOTOR NEURON DISEASE) (H): ICD-10-CM

## 2024-09-26 DIAGNOSIS — G31.09 FTD WITH MND (FRONTOTEMPORAL DEMENTIA WITH MOTOR NEURON DISEASE) (H): ICD-10-CM

## 2024-09-26 DIAGNOSIS — F02.80 FTD WITH MND (FRONTOTEMPORAL DEMENTIA WITH MOTOR NEURON DISEASE) (H): ICD-10-CM

## 2024-09-26 DIAGNOSIS — R47.1 DYSARTHRIA: ICD-10-CM

## 2024-09-26 DIAGNOSIS — F33.8 SEASONAL DEPRESSION (H): ICD-10-CM

## 2024-09-26 LAB
EXPTIME-PRE: 5.15 SEC
FEF2575-%PRED-PRE: 125 %
FEF2575-PRE: 2.96 L/SEC
FEF2575-PRED: 2.35 L/SEC
FEFMAX-%PRED-PRE: 57 %
FEFMAX-PRE: 4.77 L/SEC
FEFMAX-PRED: 8.29 L/SEC
FEV1-%PRED-PRE: 102 %
FEV1-PRE: 3.01 L
FEV1FEV6-PRE: 81 %
FEV1FEV6-PRED: 78 %
FEV1FVC-PRE: 81 %
FEV1FVC-PRED: 77 %
FIFMAX-PRE: 2.13 L/SEC
FVC-%PRED-PRE: 97 %
FVC-PRE: 3.74 L
FVC-PRED: 3.82 L
MEP-PRE: 93 CMH2O
MIP-PRE: -48 CMH2O

## 2024-09-26 PROCEDURE — 92526 ORAL FUNCTION THERAPY: CPT | Mod: GN | Performed by: SPEECH-LANGUAGE PATHOLOGIST

## 2024-09-26 PROCEDURE — 94799 UNLISTED PULMONARY SVC/PX: CPT | Performed by: INTERNAL MEDICINE

## 2024-09-26 PROCEDURE — 92610 EVALUATE SWALLOWING FUNCTION: CPT | Mod: GN | Performed by: SPEECH-LANGUAGE PATHOLOGIST

## 2024-09-26 PROCEDURE — G2211 COMPLEX E/M VISIT ADD ON: HCPCS | Performed by: PSYCHIATRY & NEUROLOGY

## 2024-09-26 PROCEDURE — 99214 OFFICE O/P EST MOD 30 MIN: CPT | Performed by: PSYCHIATRY & NEUROLOGY

## 2024-09-26 PROCEDURE — 92507 TX SP LANG VOICE COMM INDIV: CPT | Mod: GN | Performed by: SPEECH-LANGUAGE PATHOLOGIST

## 2024-09-26 PROCEDURE — 94375 RESPIRATORY FLOW VOLUME LOOP: CPT | Performed by: INTERNAL MEDICINE

## 2024-09-26 PROCEDURE — 92522 EVALUATE SPEECH PRODUCTION: CPT | Mod: GN | Performed by: SPEECH-LANGUAGE PATHOLOGIST

## 2024-09-26 RX ORDER — ESCITALOPRAM OXALATE 10 MG/1
10 TABLET ORAL DAILY
Qty: 90 TABLET | Refills: 1 | Status: SHIPPED | OUTPATIENT
Start: 2024-09-26

## 2024-09-26 ASSESSMENT — REVISED AMYOTROPHIC LATERAL SCLEROSIS FUNCTIONAL RATING SCALE (ALSFRS-R)
CUTTING_FOOD_AND_HANDLING_UTENSILES: 4
SWALLOWING: 2
DYSPNEA: OCCURS AT REST: DIFFICULTY BREATHING WHEN EITHER SITTING OR LYING
TURNING_IN_BED_AND_ADJUSTING_BED_CLOTHES: 4
SALIVATION: MARKED EXCESS OF SALIVA WITH SOME DROOLING
DRESSING_AND_HYGEINE: NORMAL FUNCTION
ALSFRS_TOTAL_SCORE: 35
BULBAR_SUBTOTAL: 4
HANDWRITING: 4
ORTHOPENA: 3
RESPIRATORY_INSUFFICIENCY: 3
RESPIRATORY_INSUFFICIENCY: INTERMITTENT USE OF NIPPV
SALIVATION: 1
FINE_MOTOR_SUBTOTAL_SCORE: 12
CLIMBING_STAIRS: NORMAL
RESPIRATORY_SUBTOTAL_SCORE: 7
DRESSING_AND_HYGEINE: 4
WALKING: NORMAL
DYSPNEA: 1
CLIMBING_STAIRS: 4
SIX_ITEM_SUBTOTAL: 18
HANDWRITING: NORMAL
SPEECH: SPEECH COMBINED WITH NON-VOCAL COMMUNICATION
SPEECH: 1
ORTHOPENA: SOME DIFFICULTY SLEEPING AT NIGHT DUE TO SHORTNESS OF BREATH, DOES NOT ROUTINELY USE MORE THAN TWO PILLOWS
SWALLOWING: DIETARY CONSISTENCY CHANGES
WALKING: 4
TURNING_IN_BED_AND_ADJUSTING_BED_CLOTHES: NORMAL
GROSS_MOTOR_SUBTOTAL_SCORE: 12

## 2024-09-26 ASSESSMENT — PAIN SCALES - GENERAL: PAINLEVEL: NO PAIN (0)

## 2024-09-26 NOTE — PATIENT INSTRUCTIONS
Will offer you medication for drooling (Robinul 1 mg 3 times a day).    If you have a lot of phlegm getting stuck in your throat, please use the CoughAssist device.    Please try to use the NIV machine at night as tolerated. Call [employer]-number should be on the machine-and ask to have your settings adjusted. If you can't find a number, then call the General Line @ [phone]    You will talk to our research coordinator today.    We should start thinking about feeding tube placement, and I would recommend doing so in the next 2 to 3 months. Send Dr Jmaeson a [employer] message or call Lindsey @ [phone] to let us know when you are ready-as soon as possible.

## 2024-09-26 NOTE — PROGRESS NOTES
CLINICAL NUTRITION SERVICES - REASSESSMENT NOTE     EVALUATION OF PREVIOUS PLAN OF CARE:   Referring Physician: Dr. Jameson  Current diet: Soft foods   Current appetite/intake: Decent, but patient reports having to eat very very slowly to avoid choking   Bowel movements: Constipation   PEG Tube: N/A, education provided today     Monitoring from previous assessment:   -Food intake - Potato Salad, Omelette, Yogurt, Pudding, malts, burrito  -Fluid/beverage intake - Gatorade, 2-3 16 oz water bottles.   -Liquid meal replacement or supplement - None  -Weight trends - Pt with 12 lb (6.3 %) weight loss over <1 month.    Wt Readings from Last Encounters:   09/26/24 78.4 kg (172 lb 12.8 oz)   09/02/24 83.7 kg (184 lb 8.4 oz)   06/20/24 80.8 kg (178 lb 3.2 oz)   06/10/24 81.2 kg (179 lb)   12/08/23 79.4 kg (175 lb)   12/08/23 79.4 kg (175 lb)     REASSESSED NUTRITION NEEDS   Current Weight: 78.4 kg  IBW: 70 kg   Dosing Weight: 78 kg  Estimated Energy Needs: 9372-6359 kcals/day (30 - 35 kcals/kg )  Justification: Increased needs with ALS progression  Estimated Protein Needs:  grams protein/day (1.2 - 1.5 grams of pro/kg)  Justification: Increased needs with ALS progression  Estimated Fluid Needs: 2660-5345 mL/day (25 - 30 mL/kg)   Justification: Maintenance     Previous Goals:   1.  Aim for 5-6 small frequent meals  2.  Aim for 2025 kcal and 80 g protein/day  3. Weight maintenance   Evaluation: Not met     Previous Nutrition Diagnosis:   Inadequate oral intake related to eating fatigue as evidenced by patient notes 20# wt loss since diagnosis.    Evaluation: No change     NEW FINDINGS:   Visited with Rajeev and wife. Rajeev reports eating takes him about 3 hours, and mostly reports intake of softer foods. Rajeev stated that he has purchased a device to help him if he is choking and has one in many different locations so its always on hand. Provided education on gastrostomy tube and answered questions regarding use  and formula. Rajeev also noted constipation and was inquiring about how tube feeding would impact his BMs, will pick fiber containing formula.     CURRENT NUTRITION DIAGNOSIS   Inadequate oral intake related to eating fatigue and dysphagia as evidenced by weight loss of 12 lbs (6.3%) x <1 month.     INTERVENTIONS   Recommendations / Nutrition Prescription   Enteral Nutrition   Formula: [employer] 1.4  Volume: 5 cartons/day (1625 mL, 1155 mL free water)  Provisions: 2275 kcal (29.1 kcal/kg), 101 g protein (1.3 g/kg), 255 g CHO, 24 g fiber (DW: 78 kg)    - First bolus with 0.5 can over 60 minute period. If pt without sx of GI intolerance, increase volume by 0.5 can Q4hrs until reach max bolus volume of 2 cans per bolus. Continue with 2 cans in AM, 1 can in afternoon, and 2 cans in PM. No feeds overnight.  - Change H2O flushes to 120 mL before & after each bolus TID + additional 120 mL once daily.     Implementation  EN Composition, EN Schedule, Feeding Tube Flush   Composition of Meals and Snacks, and General/healthful diet     Goals   1.  Aim for 5-6 small frequent meals  2.  Aim for 2340 kcal and 95 g protein/day  3. Weight maintenance    Follow up/Monitoring:   Progress towards goals will be monitored and evaluated per protocol and Practice Guidelines    Destinee Butler RD, LD

## 2024-09-26 NOTE — LETTER
9/26/2024       RE: Rajeev Montalvo  [address] [facility] [address]     Dear Colleague,    Thank you for referring your patient, Rajeev Montalvo, to the [facility] [place] [facility]. Please see a copy of my visit note below.    [place], September 26, 2024    I had the pleasure to see Mr. 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  I first saw him 3 months ago when he reported a 1 year history of progressive dysarthria and dysphagia.  He also had sialorrhea.  T the [facility] ALSA examination showed combined upper and lower motor neuron signs in the bulbar region.  There was no upper or lower limb weakness, muscle atrophy, or spasticity.  He declined an EMG study.  Brain MRI did not show an alternative explanation for his symptoms.  At the initial visit, we discussed medications like riluzole and Radicava, and the patient declined them.  He was also not interested in genetic testing or receiving medications for sialorrhea.    Because of abnormal pulmonary function test with an unusual (for ALS) obstructive pattern, I referred him to pulmonology and he saw Dr. Borrego in 7/2024.  He repeated the patient spirometry at that time and MIP was found to be -15 cmH2O (compared to -59 at the original visit in our clinic on June 2024.  The patient was also reporting orthopnea.  On those grounds, Dr. Borrego Prescribed BiPAP to the patient.  He is trying to use the mask every night, but she cannot tolerate it at all times due to very high pressures, in his opinion.  Rajeev denies dyspnea on exertion and he does not use extra pillows at night.    His sialorrhea continues, and sometimes it bothers him.  He has difficulty clearing thick secretions/phlegm, and he does have a CoughAssist device at home, but he does not use it.    His dysarthria has  "somewhat progressed and he uses a buggy board to communicate.  He is interested in alternative augmentative communication devices.  He does have mild pseudobulbar affect, but it is not particularly bothersome.    His wife believes that his dysphagia has somewhat worsened 2.  He has lost another 8 pounds since the last visit and occasionally chokes on solids and liquids.    He continues to deny any upper or lower extremity weakness, or clumsiness.    BP (!) 174/90 (BP Location: Left arm, Patient Position: Sitting, Cuff Size: Adult Regular)   Pulse 51   Resp 16   Ht 1.72 m (5' 7.72\")   Wt 78.4 kg (172 lb 12.8 oz)   SpO2 99%   BMI 26.49 kg/m          Latest Ref Rng & Units 9/26/2024     8:58 AM   PFT   FVC L 3.74  P   FEV1 L 3.01  P   FVC% % 97  P   FEV1% % 102  P      P Preliminary result     MIP -48 cm H2O today, which was quite better than the value obtained during Dr. Borrego's visit in 7/2024.  This, in my opinion, suggests poor performance in the previous test.    EXAM: He is awake, alert, oriented x 3.  On the focused examination, there is no weakness, muscle atrophy, or fasciculations in the upper or lower extremities.  He continues with severe spastic dysarthria, minimally intelligible.    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  I explained to him that there is a significant difference in prognosis between the 2 entities, because the average survival for pure PBP, which is quite rare, is often 5 years or more, whereas bulbar onset ALS survival is typically less than 3 years, and sometimes less than 2.  In this case, an EMG would be necessary to differentiate, because clinically there is no evidence of upper or lower motor neuron signs in the cervical, thoracic, or lumbar region.  Despite the important role of an EMG study in determining his prognosis, he continues to decline it.    He continues to decline FDA approved medications for ALS or genetic testing.  He was interested in medications for " sialorrhea, and I will prescribe him Robinul 1 mg 3 times daily.    We had an extensive discussion about gastrostomy placement.  I think it is medically indicated at this point, and the patient believes that this is acceptable.  I will notify our nurse. Our speech-language pathologist and registered dietitian will talk to him in detail about this, and we will plan to do the procedure in the next 2 months.    Today, I am not so convinced about the presence of neuromuscular respiratory failure related to ALS.  His FVC and FEV1 is excellent, and his MIP is much better than the value obtained by Dr. Borrego in 7/2024. MIP of --48 cm H2O in a patient with bulbar palsy may be simply due to inability to do a full lip seal.  In any case, if Dr. Borrego recommends continuing IV, I am okay with it.    He has pseudobulbar affect, but it is mild, and we will defer medications for now.    He will talk to our research coordinator today.  He was interesting in acupuncture and electrical needle stimulation of the throat muscles and the tongue.  I am not familiar with those approaches and there is no evidence ( I am aware of) that any of those would be beneficial for ALS.  Therefore, I did not recommend them.    I will see him in follow-up in 3 months, sooner if needed.    Sincerely,    Jorge Jameson MD, FAAN    The longitudinal plan of care for the diagnosis(es)/condition(s) as documented were addressed during this visit. Due to the added complexity in care, I will continue to support Rajeev in the subsequent management and with ongoing continuity of care.    Billing MDM level 4 (moderate) based on 1) Problems assessed: One chronic disorder with progression, exacerbation, or side effects of treatment (ALS) and 2)Risk: prescription drug management, see above.        Again, thank you for allowing me to participate in the care of your patient.      Sincerely,    Jorge Jameson MD

## 2024-09-26 NOTE — NURSING NOTE
"Chief Complaint   Patient presents with    RECHECK     BP (!) 174/90 (BP Location: Left arm, Patient Position: Sitting, Cuff Size: Adult Regular)   Pulse 51   Resp 16   Ht 1.72 m (5' 7.72\")   Wt 78.4 kg (172 lb 12.8 oz)   SpO2 99%   BMI 26.49 kg/m      SIDNEY KENNEDY    "

## 2024-09-26 NOTE — PROGRESS NOTES
SPEECH LANGUAGE PATHOLOGY EVALUATION    See electronic medical record for Abuse and Falls Screening details.    Subjective   Presenting condition or subjective complaint: ALS  Date of onset: diagnosed 6/10/24  Relevant medical history: Refer to medical record  Prior therapy history for the same diagnosis, illness or injury: patient is followed in this ALS Multidisciplinary Clinic on average every 3 months, last seen 6/10/24  Others at Clinic Visit: significant other Hanane  Patient goals for therapy/concerns: Increased swallowing difficulty, Increased speech deficits, Augmentative / Alternative communication needs       Objective   Cardio-Respiratory Status: Forced vital capacity: 97% of predicted   Height/Weight: 5'8 and 172lbs  Functional Rating Scale (ALS-FRS):  35/48  10 Meter Walk test: defer to PT  Speaking Rate: not assessed      Oral Motor/Swallowing  Current Diet/Method of Nutritional Intake: thin liquids (level 0), pureed (level 4), minced & moist (level 5) (describes intake of potato salad, yogurt, pudding, omlets, burritos with only soft items. No breads or nuts and everything is cut small)  Level of assist required for feeding: no assistance needed    Oral Motor Function: Anomalies present:    Mandibular anomaly- none although he continues to report weakness with fatigue  Labial anomaly- ROM (mild), Strength (moderate), and Rate (mild-moderate)  Lingual anomaly- ROM (severe), Strength (severe), and Rate (moderate-severe)  Velar elevation- reduced, nasal air emission on cheek puff  Buccal strength- difficult to elicit    Volitional Cough: Functional  Volitional Throat Clear: Functional  Volitional Swallow: Present  Oral Hygiene: Adequate    Swallow Function:  Thin Liquids: reduced bolus prep/control and A-P propulsion, suspected premature spillage, reduced pharyngeal constriction, multiple swallows to pass bolus, reduced laryngeal elevation, with no overt signs of aspiration/penetration.  Regular Solids:  "increased mastication time, oral residue, significantly reduced bolus prep/control and A-P propulsion, reduced pharyngeal constriction, multiple swallows to pass bolus, reduced laryngeal elevation, but no overt signs of aspiration/penetration.    Dysphagia Diagnosis: Moderate dysphagia  Diet Texture Recommendations: thin liquids (level 0), pureed (level 4), minced & moist (level 5)  Recommended Feeding/Eating Techniques: see description below  Medication Administration Recommendations: continue current  Instrumental Assessment Recommendations: instrumental evaluation not recommended at this time    Dysphagia Intervention: SLP educated in swallowing anatomy and physiology including aspiration and risk of respiratory compromise from aspiration. A Video Swallow Study was ordered after last visit but he cancelled appointment, does not wish to pursue. MD again discussed potential need for alternative form of nutrition/feeding tube placement and RD provided demonstration/education. Given further decline in swallow function SLP agrees with recommendation for tube placement and PO for pleasure. Trained safe swallow strategies to reduce aspiration risks (small bites/sips, slow rate of intake, mindful swallowing, alternate consistencies, monitoring for fatigue with intake). Also trained diet modifications to reduce risk of aspiration and ease intake (choosing soft moist solids and/or pureed solids). He has several questions regarding \"radical\" options such as \"electro-shock to my tongue\". Informed him of e-stim therapy but it's contraindication in ALS, he would still like to explore. He had a lot of questions regarding exercises and despite SLP repeated explanation that strengthening exercises would not improve strength and likely only cause increased fatigue he continued to ask repeated questions. He also asks of people he has read about online that recover from ALS, deferred to MD but explained that this SLP has never seen " "it. He continues to use the \"de-choker\" device he purchased online, stating he has six of them and uses them several times per day. Last visit it was felt that he was using this to relieve laryngospasms, this was not discussed in any further detail today.       Speech Intelligibility/Functional Communication   Methods of communication: Verbal    Speech Intelligibility:  40-50% intelligible to this skilled listener in quiet environment  Respiration Observations: WNL  Phonation: strained-strangled quality  Resonance: hypernasal  Articulation: imprecise articulation, slow effortful rate  Speaking Rate: not assessed due to severity of dysarthria    Motor speech level of impairment: moderate to severe impairment, severe impairment  Speech Intelligibility/Communication: Impacts daily activities, Impacts social activities, Impacts phone usage, and Impacts ability to communicate basic wants/needs  Augmentative and Alternative Communication (AAC):  Using Boogie Board during visit. He has Speech Assistant and Project Relate apps to his Android phone     Communication Intervention: SLP trained use of speech strategies to improve intelligibility and reduce fatigue with speaking as well as listener strategies. SLP provided further education in Augmentative-Alternative Communication. He is using Boogie Board for written communication throughout visit. He has Speech Assistant AAC and Project Relate apps on his Android phone but has not been using. SLP provided training in use of Speech Assistant yi and provided handout. He states he would like to use it to make phone calls so educated in option of getting him and Android Tablet from [employer] so he would use the yi on the tablet and then phone to make the call. He would like it to be smaller so we discussed having another phone that is only used for the AAC apps and he reports he has an old one he could use for that (without need for cell service). However he does not have " Ida at home to install the apps, suggested he use his phone's hot spot and he didn't know how to do that so I made a referral to [employer] to provide ongoing support and training. He has Project Relate yi and has recorded over 600 sentences for it to learn his speech patterns and translate however upon trials today it was not accurate so SLP educated in recommendation to create additional sentences since his speech has declined since previous recordings completed. Voice Preservation not discussed due to severity of dysarthria.    Cognition  ALS CBS (ALS Cognitive Behavioral Screen) completed today    Total Cognitive score (assessed by clinician): 12/20  *Cognitive scores ranging from 17-20 do not support the presence of clear cognitive impairment  *Scores below 16 raise suspicion of cognitive impairment with this suspicion increasing significantly for scores falling below 12 suggesting need for further evaluation  *Scores below 10 raise considerable suspicion for ALS-FTD or other dementia and suggests need for comprehensive evaluation    Total Behavioral Score (completed by patient's caregiver):  15/45   ALS Caregiver Behavioral Questionnaire, completed by Significant other Hanane  *Behavioral score of 36 or less suspicious of behavioral impairment that may be endorsed on comprehensive evaluation  *Behavioral score equal to or less than 32 suspicious of FTD (Frontotemporal dementia) and suggests need for comprehensive evaluation        Assessment & Plan   Clinical Impressions  Medical Diagnosis: ALS  Treatment Diagnosis:  Dysarthria, Dysphagia  Impression/Assessment: Pt is a 68 year old male with ALS. The following significant findings have been identified: impaired speech intelligibility and impaired swallowing, characterized by moderate-severe to severe dysarthria and moderate dysphagia. Identified deficits interfere with their ability to communicate wants and needs, communicate within the home or community,  consume an oral diet, and maintain nutrition as compared to previous level of function.    Plan of Care  Treatment Interventions:  Swallowing dysfunction and/or oral function for feeding, Speech, Communication    Prognosis to achieve stated therapy goals is good   Rehab potential is impacted by: comorbidities    Long Term Goals: Based on today's evaluation session patient and/or caregiver will have understanding of current communication and/or swallowing status and recommendations for management.  Frequency of Treatment: one time only eval and treat within ALS Interdisciplinary Clinic  Duration of Treatment: one time only eval and treat within ALS Interdisciplinary Clinic  Recommended Referrals to Other Professionals: None     Risks and benefits of evaluation/treatment have been explained.   Patient/Family/caregiver agrees with Plan of Care.     Recommendations:   Recommend alternative form of nutrition with PO (by mouth) intake for pleasure  Pureed and minced and moist solids and thin liquids.  Use of swallowing strategies listed above  Refrain from any oral motor strengthening strategies (exercises, stimulation)  Increase use of AAC apps, record more sentences for Project Relate  Work with ALSA to configured system for making phone calls  Use speech strategies listed above    Education provided/response:   Speech  Swallowing  AAC  Diet  Verbalized understanding    Treatment provided this date:   Swallow intervention, 37 minutes (see above for details)  Communication intervention, 17 minutes (see above for details)    Response to recommendations/treatment: verbalized understanding, asked appropriate questions, agreed to recommendations    Goal attainment: All goals met    Total Evaluation Time (minutes): 20  Timed Code Treatment (minutes): 0 minutes  Total Treatment Time (sum of timed and untimed services): 54     Present: Not applicable     Signing Clinician: Karrie Renee,  SLP    Certification:  Onset date: 9/26/24  Start of care date: 9/26/24  Certification date from 9/26/24 to 9/26/24    I CERTIFY THE NEED FOR THESE SERVICES FURNISHED UNDER        THIS PLAN OF TREATMENT AND WHILE UNDER MY CARE     (Physician attestation of this document indicates review and certification of the therapy plan).

## 2024-09-26 NOTE — PROGRESS NOTES
Social Work -ALS Clinic Progress Note  [facility]    Data/Intervention:    Patient Name:  Rajeev Montalvo  /Age:  1955 (68 year old)    Visit Type: in person    Collaborated With:    -Edy and Hanane his SO  -Dr Jameson and members of the ALS interdisciplinary team    Psychosocial info/Concerns:   Of note today, Pt had cognitive screening- ALS CBS and results were surprising to Edy and he will be referred for neuro psych testing. He plans to have a g tube placed. He communicates with his boogie board and was actively involved in our conversation.   They now have the $20,000 [employer] caregiver florentino in place and they have family care givers getting lined up to provide care.     Intervention/Education/Resources Provided:  Discussed where the financial issues are currently at. I had reviewed the status with my Care Coordination colleagues in primary care who have been assisting also. He has been approved for the [employer] Financial Assistance program for help with his medical bills. We discussed that next month is when he can sign up for a Medicare plan and I recommended they do that and it will include a Part D plan which he currently does not have. They would like my assistance so I will facilitate a call with the Sr Linkage line after Oct 15th.     His Medical Assistance application is pending and there is more paperwork required. The issue is his assets. He owns a lot of old vehicles, a snowmobile, a tractor that doesn't work and he likes to look a them and some of them he uses. He also has some funds in a 401K that would make him ineligible for the Elderly waiver but it's low enough for the Alternative Care program. The vehicles could be an issue. We discussed possibly selling those. Because they have the ALS Tomassoni florentino, for now, they will not pursue MA. Will need to address getting a [place] Choices assessment down the road for the AC program instead. Hanane declined the [place]  choices [place] when she was contacted.     We addressed needing a health care directive today before he has his Gtube. They had one prepared by their friend who is a  but he was hesitant to sign it he said. Now, he doesn't feel hesitant. He wrote that he does not want intubation. Their goal for next week is to get that paperwork out and review it and get it signed/notarized.     A POLST discussion with Dr Jameson will be appropriate for a future ALS clinic appt. Pt declined an EMG to determine specific dx type which would inform his prognosis.     Discussed the progress they have made on all of the issues they have needed to address since moving to [place] and with his health issues. Anxiety levels much less today, although Hanane suffers from anxiety and we discussed getting her to a PCP appt to address that. (She said she has appt in Oct). She was worried about what her health insurance needs are and she went thru her insurance cards in her purse and found she has [employer] so we discussed that she has great coverage.     They have a family wedding this weekend they are looking forward to.     Assessment/Plan:  Pt has settled into his diagnosis and is processing his future in a different way that is more realistic. Despite the identified cognitive concerns today, he was able to process all the info we discussed and able to later address with Hanane the things they need to do, including the Oct 15th date for open enrollment.   Will await their call to assist with enrollment in a medicare plan.     Provided patient/family with contact information and encouraged them to contact me between clinic visits as needed.     Connie Fox, [place], [facility]    ealth  [facility]  [phone]

## 2024-09-30 ENCOUNTER — TELEPHONE (OUTPATIENT)
Dept: NEUROLOGY | Facility: CLINIC | Age: 69
End: 2024-09-30
Payer: MEDICARE

## 2024-09-30 NOTE — TELEPHONE ENCOUNTER
LVM for patient's SO that cognitive screening was abnormal and Dr Jameson would like him to do some Neuropsych testing. Scheduling number provided, relayed scheduling is several months out.

## 2024-10-03 ENCOUNTER — TELEPHONE (OUTPATIENT)
Dept: NEUROLOGY | Facility: CLINIC | Age: 69
End: 2024-10-03
Payer: MEDICARE

## 2024-10-03 ENCOUNTER — TELEPHONE (OUTPATIENT)
Dept: SPEECH THERAPY | Facility: CLINIC | Age: 69
End: 2024-10-03
Payer: MEDICARE

## 2024-10-03 NOTE — TELEPHONE ENCOUNTER
[facility] Call Center    Phone Message    May a detailed message be left on voicemail: yes     Reason for Call: Other: Patient's spouse called stating patient is ready to go ahead with feeding tube and requests call back to discuss the next steps of the process. The state they have tried reaching Lindsey yesterday but have not heard back yet. Please review.     Action Taken: Message routed to:  [facility] ([facility]): Neurology    Travel Screening: Not Applicable     Date of Service:

## 2024-10-03 NOTE — TELEPHONE ENCOUNTER
I received a voicemail from Hanane asking about options for thickening Rajeev' liquids. She reports he is getting dehydrated because it is too difficult for him to drink enough water and she recalled previous education in benefit of thickening liquids. SLP called back and educated in options of powder vs gel as well as slightly vs mildly thick. She will purchase the gel and start using to slightly thick consistency and if this doesn't improve his abilities will increase to mildly thick etc.    She also reports he has decided to pursue the feeding tube and he is very eager to get his appointment scheduled and that Hanane has left several messages. I explained that clinic nurse has been out of the office but will return tomorrow. I also explained that nurse may not call right away but she will as soon as she can. Hanane states Rajeev wants his feeding tube placed in November so she is eager to get it scheduled. SLP explained that nurse will be able to help them and hopefully this will be feasible but that this SLP doesn't have that information.

## 2024-10-04 ENCOUNTER — PATIENT OUTREACH (OUTPATIENT)
Dept: CARE COORDINATION | Facility: CLINIC | Age: 69
End: 2024-10-04
Payer: MEDICARE

## 2024-10-04 DIAGNOSIS — G12.21 ALS (AMYOTROPHIC LATERAL SCLEROSIS) (H): ICD-10-CM

## 2024-10-04 DIAGNOSIS — G12.21 ALS (AMYOTROPHIC LATERAL SCLEROSIS) (H): Primary | ICD-10-CM

## 2024-10-04 NOTE — CONSULTS
Outpatient Percutaneous Gastrotomy/Gastrojejunostomy Tube IR Referral  10/04/24    Referring Provider: Dr. Jameson   IR Referral Request: To place a G tube for Nutrition. Patient admission to 6A post procedure. Pt taking a multitude of supplements-please discuss with patient if necessary.   Please schedule for a Mon, Tues or Wednesday       Procedure Approved for: G tube attempt:  if there is not a safe percutaneous window tube placement procedure will be cancelled. ALS referring team prefers that they still be admitted under neurology as an observation admission, whereupon our service can place a general surgery or GI consult for urgent gastrostomy. These tubes are typically not exchanged routinely.     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  Patient no obvious abdominal surgeries.     Pertinent Imaging Reviewed:  No abdominal imaging in PACs    Med holds NA      Procedure order placed.    Requesting team, DR. Jameson, made aware of IR recommendations via Epic messaging.        TARA Mercado CNP  Interventional Radiology   IR on-call pager: [phone]

## 2024-10-11 ENCOUNTER — PATIENT OUTREACH (OUTPATIENT)
Dept: CARE COORDINATION | Facility: CLINIC | Age: 69
End: 2024-10-11
Payer: MEDICARE

## 2024-10-11 NOTE — PROGRESS NOTES
"Clinic Care Coordination Contact  [facility]/Voicemail    Clinical Data: Care Coordinator Outreach    Outreach Documentation Number of Outreach Attempt   10/11/2024   1:23 PM 2     Left message on patient's and patient's partner Hanane's voicemail with call back information and requested return call.    FRW's notes on 10/4/24: \"FRW called and Hanane stated that has a lot assists and so he did not qualify for MA\"    [facility]ann[facility] Care: Approved on 05/08/2024 (10/02/2019 - 11/08/2024) for 100% CC. Applies to all visits.     CHW mailed [facility] Care Application Form in case patient needs to re-apply and renew it before 11/08/2024.     Plan: Care Coordinator will send unable to contact letter with care coordinator contact information via MyChart and mail. Care Coordinator will try to reach patient again in 10 business days.    Sissy Kinsey  Community Health Worker   [email or web address]   Clinic Care Coordination / Ambulatory Care Management  [facility], and [facility]  [email or web address]  Office: [phone]  Gender Pronouns: She/her/hers  Employed by [employer]      "

## 2024-10-11 NOTE — LETTER
[facility] CARE COORDINATION        October 11, 2024    Rajeev Montalvo  [address] [place] [facility] [address]      Dear Rajeev,    I just attempted to reach you but couldn't get a hold of you. I would like to continue to work with you and provide any additional support you may need on achieving your health care related goals. I would appreciate if you would give me a call at [phone]. I know that there are many things that can affect our ability to communicate and I hope we can continue to work together.    Meanwhile, I'm mailing you [facility] Application Form in case you need to re-apply and renew it before it expires on 11/8/2024. Currently, it applies to all visits with 100% coverage.     All of us at the [facility] are invested in your health and are here to assist you in meeting your goals.     Sincerely,    Sissy Kinsey  Community Health Worker   [email or web address]   Clinic Care Coordination / Ambulatory Care Management  [facility], and [facility]  [email or web address]  Office: [phone]  Gender Pronouns: She/her/hers  Employed by [facility] [employer]      Enclosed: [facility] Application Form (via USPS mail)

## 2024-10-16 ENCOUNTER — TELEPHONE (OUTPATIENT)
Dept: INTERVENTIONAL RADIOLOGY/VASCULAR | Facility: CLINIC | Age: 69
End: 2024-10-16
Payer: MEDICARE

## 2024-10-17 NOTE — TELEPHONE ENCOUNTER
Received call back from Hanane and patient regarding g tube scheduled 10/23. They had several great questions but ultimately would like this cancelled. Encouraged Ahnane and patient to follow-up with their neurology team in regards to this. Writer also sent a staff message to nurse and MD with update.     Since g tube placement is also coordinated with hospital admission, will wait to hear back from neuro team to finalize cancellation with IR in case patient changes their mind after discussing this further with their care team.    Fiona Ordonez, RN  Interventional Radiology  [phone]

## 2024-10-21 ENCOUNTER — PATIENT OUTREACH (OUTPATIENT)
Dept: CARE COORDINATION | Facility: CLINIC | Age: 69
End: 2024-10-21
Payer: MEDICARE

## 2024-10-21 NOTE — PROGRESS NOTES
Called patient due to their preventative visit is overdue. Transferred patient to scheduling line. [phone]      Pt declined to scheduled.

## 2024-10-31 ENCOUNTER — PATIENT OUTREACH (OUTPATIENT)
Dept: CARE COORDINATION | Facility: CLINIC | Age: 69
End: 2024-10-31
Payer: MEDICARE

## 2024-10-31 NOTE — PROGRESS NOTES
Clinic Care Coordination Contact  Follow Up Progress Note      Assessment: RN CC received return call from Hanane. She was abrupt about answering follow up questions. Rjaeev will not get any vaccinations. She has reviewed the resources sent to her on SSDI. She asked RN CC what the upper limit is on assets. RN CC explained that the disability specialists are the knowledgable people to discuss this with. She disconnected the call saying she needed to take care of the patient.     Care Gaps:    Health Maintenance Due   Topic Date Due    ASTHMA ACTION PLAN  Never done    ASTHMA CONTROL TEST  Never done    Pneumococcal Vaccine: 65+ Years (1 of 2 - PCV) Never done    COLORECTAL CANCER SCREENING  Never done    DTAP/TDAP/TD IMMUNIZATION (1 - Tdap) Never done    ZOSTER IMMUNIZATION (1 of 2) Never done    LUNG CANCER SCREENING  Never done    RSV VACCINE (1 - Risk 60-74 years 1-dose series) Never done    MEDICARE ANNUAL WELLNESS VISIT  Never done    AORTIC ANEURYSM SCREENING (SYSTEM ASSIGNED)  Never done    ADVANCE CARE PLANNING  08/29/2023    INFLUENZA VACCINE (1) 09/01/2024    COVID-19 Vaccine (1 - 2024-25 season) Never done     Patient will not get any vaccinations.     Care Plans  Care Plan: Financial Resource Worker       Problem: Financial Resource Worker       Goal: Financial Resource Worker       Start Date: 4/8/2024 Expected End Date: 10/7/2024    This Visit's Progress: 90% Recent Progress: 90%    Note:     Barriers: Early bulbar ALS; Limited income; Provider availability - wait time to complete appointments.   Strengths: Motivated; Agreeable to Care Coordination.   Patient expressed understanding of goal: Yes.     Action steps to achieve this goal:  1. I will respond to the Financial Resource Worker (FRW). (9/6/24 Update: Ongoing)  2. I will return calls from the FRW promptly in order to complete assessments timely. (9/6/24 Update: Ongoing)  3. I will contact my care team with questions, concerns or support needs. I  will use the clinic as a resource and I understand I can contact my clinic with 24/7 after hours services available. Care Coordinator will remain available as needed. (9/6/24 Update: Ongoing)  4. I will wait to hear from [facility] FRW again. (9/6/24 Update: Ongoing)                            Intervention/Education provided during outreach: Discussed patients plan of care. CC RN asked open ended questions, provided support, resources, and encouragement as needed. Patient will reach out to care team sooner than planned with new questions or concerns.     Plan: Care Coordinator will continue to follow patient for any additional needs.     Care Coordinator will follow up in 1 month.     Radha Telles RN, BSN, CPHN, [facility], and [facility]  [email or web address]  Office: [phone]  Employed by [employer] Care Coordination Contact  [facility]/Voicemail    Clinical Data: Care Coordinator Outreach    Outreach Documentation Number of Outreach Attempt   10/11/2024   1:23 PM 2   10/31/2024   9:54 AM 1     RN CC contacted patient's SO for monthly outreach. Woke her up. She requested a return call in 10 minutes. Called back in 15 minutes.   Left message on patient's voicemail with call back information and requested return call.    Plan: Care Coordinator will try to reach patient again in 10 business days.    Radha Telles RN, BSN, CPHN, [facility], and [facility]  [email or web address]  Office: [phone]  Employed by [employer] Care Coordination Contact  Care Coordination Clinician Chart Review    Situation: Patient chart reviewed by Care Coordinator.       Background: Care Coordination Program started: 3/29/2024. Initial assessment completed and patient-centered care plan(s) were developed with participation from patient. Lead CC handed  patient off to CHW for continued outreaches.       Assessment: Per chart review, patient outreach attempted by CC CHW on 10/11/24.  Per standard of work protocol, outreach will the attempted again in 10 business days. Patient is actively working to accomplish goal(s) per last successful outreach on 9/6/24. Patient's goal(s) appropriate and relevant at this time. Patient is not due for updated Plan of Care.  Assessments will be completed annually or as needed/with change of patient status.      Care Plan: Financial Resource Worker       Problem: Financial Resource Worker       Goal: Financial Resource Worker       Start Date: 4/8/2024 Expected End Date: 10/7/2024    This Visit's Progress: 90% Recent Progress: 90%    Note:     Barriers: Early bulbar ALS; Limited income; Provider availability - wait time to complete appointments.   Strengths: Motivated; Agreeable to Care Coordination.   Patient expressed understanding of goal: Yes.     Action steps to achieve this goal:  1. I will respond to the Financial Resource Worker (FRW). (9/6/24 Update: Ongoing)  2. I will return calls from the FRW promptly in order to complete assessments timely. (9/6/24 Update: Ongoing)  3. I will contact my care team with questions, concerns or support needs. I will use the clinic as a resource and I understand I can contact my clinic with 24/7 after hours services available. Care Coordinator will remain available as needed. (9/6/24 Update: Ongoing)  4. I will wait to hear from [facility] FRW again. (9/6/24 Update: Ongoing)                                 Plan/Recommendations: The patient will continue working with Care Coordination to achieve goal(s) as above. CHW will continue outreaches at minimum every 30 days and will involve Lead CC as needed or if patient is ready to move to Maintenance. Lead CC will continue to monitor CHW outreaches and patient's progress to goal(s) every 6 weeks.     Plan of Care updated and sent to patient: Emelia Garcia  Elfego RN, BSN, CPHN, [facility]  [facility], and [facility]  [email or web address]  Office: [phone]  Employed by [facility]

## 2024-11-05 DIAGNOSIS — J45.40 MODERATE PERSISTENT ASTHMA WITHOUT COMPLICATION: ICD-10-CM

## 2024-11-05 RX ORDER — BUDESONIDE 0.5 MG/2ML
0.5 INHALANT ORAL DAILY
Qty: 180 ML | Refills: 3 | Status: SHIPPED | OUTPATIENT
Start: 2024-11-05

## 2024-11-06 ENCOUNTER — TELEPHONE (OUTPATIENT)
Facility: CLINIC | Age: 69
End: 2024-11-06
Payer: MEDICARE

## 2024-11-06 NOTE — TELEPHONE ENCOUNTER
Central Prior Authorization Team  Phone: [phone]    PRIOR AUTHORIZATION DENIED    Medication: BUDESONIDE 0.5 MG/2 ML NEB SOLUTION FOR ORAL USE  Insurance Company: [employer] Phone [phone] Fax [phone]  Denial Date: 11/6/2024  Denial Reason(s):         Appeal Information: N/A  Patient Notified: NO- Unfortunately, we cannot call the patient with denials because we do not know what next steps the MD will take nor can we give medical advice, please notify the patient of what they are to expect for the continuation of their therapy from the provider.

## 2024-11-25 DIAGNOSIS — G12.21 ALS (AMYOTROPHIC LATERAL SCLEROSIS) (H): Primary | ICD-10-CM

## 2024-12-09 ENCOUNTER — TELEPHONE (OUTPATIENT)
Dept: INTERNAL MEDICINE | Facility: CLINIC | Age: 69
End: 2024-12-09
Payer: MEDICARE

## 2024-12-09 NOTE — TELEPHONE ENCOUNTER
"Hanane, patient's caregiver calls to ask if Hanane Nolasco can prescribe a medication called Azathioprine for patient's ALS. Patient says \"I have very little time left and have studied this medication for hours and understand the risks, but I am willing to take the risks\". Patient told his caregiver he's been watching a lot of Youtube videos of Dr. Sina Buenrostro, a provider who specializes in ALS and advertises/talks about this medication.     Patient sees neurologist, Jorge Jameson MD for neurology and was last seen 09/2024. Caregiver says neurologist wouldn't prescribe medication patient is requesting.     Caregiver says patient is not verbal anymore so communication goes through her or via text. Notified caregiver that clinic cannot send texts, but can use [employer]Page for a communication with patient. Provided caregiver with PushPage support line to get patient connected back to PushPage.    Caregiver notified that patient hasn't been seen for more than a year, so he is due for a visit with Hanane. Notified that medication will likely need to come from specialty and not internal medicine, but will ask Hanane Nolasco for her input.      Thank you,  Johnathan, Triage RN [place]    11:52 AM 12/9/2024         "

## 2024-12-10 NOTE — TELEPHONE ENCOUNTER
Called Hanane, on consent to communicate, informed her that Hanane Nolasco stated the medication would need to come from specialty and if they refused then they did not feel it would be helpful to patient. Hanane verbalizes understanding.     Leonora Lopez RN  [facility]

## 2024-12-10 NOTE — TELEPHONE ENCOUNTER
He would need to get medication from neurology. It is not something we prescribe    If he asked neurology already, it is not something they feel would be helpful     I only saw once, over a year ago, so could not order anything because of that, but again, this medication needs to come form specialist, if ordered

## 2024-12-11 ENCOUNTER — TRANSFERRED RECORDS (OUTPATIENT)
Dept: HEALTH INFORMATION MANAGEMENT | Facility: CLINIC | Age: 69
End: 2024-12-11
Payer: MEDICARE

## 2024-12-18 ENCOUNTER — PATIENT OUTREACH (OUTPATIENT)
Dept: CARE COORDINATION | Facility: CLINIC | Age: 69
End: 2024-12-18
Payer: MEDICARE

## 2024-12-18 ASSESSMENT — ACTIVITIES OF DAILY LIVING (ADL): DEPENDENT_IADLS:: INDEPENDENT

## 2024-12-18 NOTE — PROGRESS NOTES
Clinic Care Coordination Contact    Situation: Patient chart reviewed by care coordinator.    Background: Patient is enrolled in Care Coordination and followed by RN CC who has been monitoring patient throughout enrollment for goal progression.     Assessment: Task initiated to send patient updated Care Plan.     Plan/Recommendations: RN CC will send patient updated Care Plan via [employer] Postal [employer].     Radha Telles RN, BSN, CPHN, [facility] Ambulatory Care Management  [facility], and [facility]  [email or web address]  Office: [phone]  Employed by [employer] [facility]

## 2024-12-18 NOTE — LETTER
Dear Rajeev,   Attached is an updated Patient Centered Plan of Care for your continued enrollment in Care Coordination. Please let us know if you have additional questions, concerns or goals that we can assist with.     Sincerely,   Radha Telles RN, BSN, CPHN, CM  [facility] Ambulatory Care Management  ELAINE DIANE [facility]  Phone: [phone]  Email: [email or web address]  Patient Centered Plan of Care  About Me:        Patient Name:  Rajeev Montalvo    YOB: 1955  Age:         69 year old   [place] MRN:    [id number] Telephone Information:  Home Phone [phone]   Mobile [phone]       Address:  [address] [place] [address] Email address:  [email or web address]      Emergency Contact(s)    Name Relationship Lgl Grd Work Phone Home Phone Mobile Phone   1. TESS SANCHEZ Significant ot*    [phone]   2. VAMSI CAVAZOS* Friend    [phone]   3. VICKIE CATHERINE Friend    [phone]           Primary language:  English     needed? No   [place] [employer] [facility]:  [phone] op. 1  Other communication barriers:Physical impairment (Bulbar ALS)    Preferred Method of Communication:  Mail  Current living arrangement: I live in a private home with spouse    Mobility Status/ Medical Equipment: Independent        Health Maintenance  Health Maintenance Reviewed: Due/Overdue *** (Discussed with patient.)      My Access Plan  Medical Emergency 911   Primary Clinic Line  - {No department listed for PCP}   24 Hour Appointment Line [phone] or  [phone]) (toll-free)   24 Hour Nurse Line [phone] (toll-free)   Preferred Urgent Care No data recorded   Preferred Hospital [facility]  [phone]     Preferred Pharmacy [id number] - Closed - [place], [place] - [address] [address]     Behavioral Health Crisis Line The National Suicide Prevention Lifeline at  1-724.761.7754 or Text/Call 988           My Care Team Members  Patient Care Team         Relationship Specialty Notifications Start End    No Ref-Primary, Physician PCP - General   9/26/24     Fax: [phone]         Radha Telles, RN Lead Care Coordinator Primary Care - CC Admissions 3/29/24     Phone: [phone]         Jorge Jameson MD Assigned Neuroscience Provider   6/23/24     Phone: [phone] Fax: [phone]         [address] [facility] [id number] [facility] [address]    Iliana Borrego MD Assigned Pulmonology Provider   7/23/24     Phone: [phone] Fax: [phone]         [address] [facility] [address]    Jenni Cadena LPN Specialty Care Coordinator Neuromuscular Medicine  9/24/24     Phone: [phone] Fax: [phone]                    My Care Plans  Self Management and Treatment Plan    Care Plan  Care Plan: Financial Resource Worker       Problem: Financial Resource Worker       Goal: Financial Resource Worker       Start Date: 4/8/2024 Expected End Date: 10/7/2024    This Visit's Progress: 90% Recent Progress: 90%    Note:     Barriers: Early bulbar ALS; Limited income; Provider availability - wait time to complete appointments.   Strengths: Motivated; Agreeable to Care Coordination.   Patient expressed understanding of goal: Yes.     Action steps to achieve this goal:  1. I will respond to the Financial Resource Worker (FRW). (9/6/24 Update: Ongoing)  2. I will return calls from the FRW promptly in order to complete assessments timely. (9/6/24 Update: Ongoing)  3. I will contact my care team with questions, concerns or support needs. I will use the clinic as a resource and I understand I can contact my clinic with 24/7 after hours services available. Care Coordinator will remain available as needed. (9/6/24 Update: Ongoing)  4. I will wait to hear from [facility] FRW again. (9/6/24 Update: Ongoing)                            Care Plan: Disability Income       Problem:  Disability Income       Goal: Social Security Disability Income       Start Date: 12/6/2024 Expected End Date: 6/6/2025    This Visit's Progress: 20% Recent Progress: 10%    Priority: Medium    Note:     Barriers: Early bulbar ALS; Provider availability - wait time to complete appointments.   Strengths: Motivated; Agreeable to Care Coordination.   Patient expressed understanding of goal: Yes  Action steps to achieve this goal:  1. I will review the resources provided (verbally) on Disability Specialists.   2. I will contact Disability Specialists with any questions or for assistance in the process of applying for SSDI (Social Security disability income).   3. I will contact my care team with questions, concerns or support needs. I will use the clinic as a resource and I understand I can contact my clinic with 24/7 after hours services available. Care Coordinator will remain available as needed.                                 Action Plans on File:                       Advance Care Plans/Directives:   Advanced Care Plan/Directives on file: No    Discussed with patient/caregiver(s): Declined Further Information             My Medical and Care Information  Problem List   Patient Active Problem List   Diagnosis    HTN (hypertension)    PTSD (post-traumatic stress disorder)    HSV (herpes simplex virus) infection    Hiatal hernia    Gastroesophageal reflux disease with esophagitis    Speech abnormality      Current Medications:  {cccareplanmeds:358064}    Care Coordination Start Date: 3/29/2024   Frequency of Care Coordination: monthly, more frequently as needed     Form Last Updated: 12/18/2024

## 2024-12-18 NOTE — LETTER
[facility] COORDINATION  No address on file    December 18, 2024        Rajeev Montalvo  [address] [place] [id number]          Dear Rajeev,     Attached is an updated Patient Centered Plan of Care for your continued enrollment in Care Coordination. Please let us know if you have additional questions, concerns, or goals that we can assist with.    Sincerely,    Radha Telles, RN, BSN, CPHN, CCM  [facility] Ambulatory Care Management  [facility], and [facility]  [email or web address]  Office: [phone]  Employed by [facility]  Patient Centered Plan of Care  About Me:        Patient Name:  Rajeev Montalvo    YOB: 1955  Age:         69 year old   [place] MRN:    [id number] Telephone Information:  Home Phone [phone]   Mobile [phone]       Address:  [address] [place] [id number] Email address:  [email or web address]      Emergency Contact(s)    Name Relationship Lgl Grd Work Phone Home Phone Mobile Phone   1. TESS SANCHEZ Significant ot*    [phone]   2. VAMSI CAVAZOS* Friend    [phone]   3. FLORINVICKIE Friend    [phone]           Primary language:  English     needed? No   [place] [facility]:  [phone] op. 1  Other communication barriers:Physical impairment (Bulbar ALS)    Preferred Method of Communication:  Mail  Current living arrangement: I live in a private home with spouse    Mobility Status/ Medical Equipment: Independent (to be updated)    Health Maintenance  Health Maintenance Reviewed: Due/Overdue (Discussed with patient's partner.)    My Access Plan  Medical Emergency 911   Primary Clinic Line     24 Hour Appointment Line [phone] or  [phone]) (toll-free)   24 Hour Nurse Line [phone] (toll-free)   Preferred Urgent Care No data recorded   Preferred Hospital [facility]  [phone]      Preferred Pharmacy [id number] - Closed - [place], [place] - [address]     Behavioral Health Crisis Line The National Suicide Prevention Lifeline at 1-209.277.1961 or Text/Call 988     My Care Team Members  Patient Care Team         Relationship Specialty Notifications Start End    No Ref-Primary, Physician PCP - General   9/26/24     Fax: [phone]         Radha Telles, RN Lead Care Coordinator Primary Care - CC Admissions 3/29/24     Phone: [phone]         Jorge Jameson MD Assigned Neuroscience Provider   6/23/24     Phone: [phone] Fax: [phone]         [address] [facility] [id number] [facility] [address]    Iliana Borrego MD Assigned Pulmonology Provider   7/23/24     Phone: [phone] Fax: [phone]         [address] [facility] [address]    Jenni Cadena LPN Specialty Care Coordinator Neuromuscular Medicine  9/24/24     Phone: [phone] Fax: [phone]                  My Care Plans  Self Management and Treatment Plan    Care Plan  Care Plan: Financial Resource Worker       Problem: Financial Resource Worker       Goal: Financial Resource Worker       Start Date: 4/8/2024 Expected End Date: 10/7/2024    This Visit's Progress: 90% Recent Progress: 90%    Note:     Barriers: Early bulbar ALS; Limited income; Provider availability - wait time to complete appointments.   Strengths: Motivated; Agreeable to Care Coordination.   Patient expressed understanding of goal: Yes.     Action steps to achieve this goal:  1. I will respond to the Financial Resource Worker (FRW). (9/6/24 Update: Ongoing)  2. I will return calls from the FRW promptly in order to complete assessments timely. (9/6/24 Update: Ongoing)  3. I will contact my care team with questions, concerns or support needs. I will use the clinic as a resource and I understand I can contact my clinic with 24/7 after hours services available. Care Coordinator will remain available as needed. (9/6/24 Update:  Ongoing)  4. I will wait to hear from [facility] FRW again. (9/6/24 Update: Ongoing)                            Care Plan: Disability Income       Problem: Disability Income       Goal: Social Security Disability Income       Start Date: 12/6/2024 Expected End Date: 6/6/2025    This Visit's Progress: 20% Recent Progress: 10%    Priority: Medium    Note:     Barriers: Early bulbar ALS; Provider availability - wait time to complete appointments.   Strengths: Motivated; Agreeable to Care Coordination.   Patient expressed understanding of goal: Yes  Action steps to achieve this goal:  1. I will review the resources provided (verbally) on Disability Specialists.   2. I will contact Disability Specialists with any questions or for assistance in the process of applying for SSDI (Social Security disability income).   3. I will contact my care team with questions, concerns or support needs. I will use the clinic as a resource and I understand I can contact my clinic with 24/7 after hours services available. Care Coordinator will remain available as needed.                               Action Plans on File:     Advance Care Plans/Directives:   Advanced Care Plan/Directives on file: No    Discussed with patient/caregiver(s): Declined Further Information           My Medical and Care Information  Problem List   Patient Active Problem List   Diagnosis    HTN (hypertension)    PTSD (post-traumatic stress disorder)    HSV (herpes simplex virus) infection    Hiatal hernia    Gastroesophageal reflux disease with esophagitis    Speech abnormality      Current Medications:    Current Outpatient Medications   Medication Sig Dispense Refill    albuterol (PROAIR HFA/PROVENTIL HFA/VENTOLIN HFA) 108 (90 Base) MCG/ACT inhaler Inhale 2 puffs into the lungs every 6 hours as needed for shortness of breath, wheezing or cough (Patient not taking: Reported on 6/10/2024) 54 g 1    albuterol (PROVENTIL) (2.5 MG/3ML) 0.083% neb solution Take 1 vial (2.5  mg) by nebulization every 6 hours as needed for shortness of breath, wheezing or cough (Patient not taking: Reported on 6/10/2024) 90 mL 0    aminocaproic acid (AMICAR) 0.25 GM/ML solution Take by mouth every 6 hours (Patient not taking: Reported on 7/18/2024)      amLODIPine (NORVASC) 10 MG tablet TAKE 1 TABLET BY MOUTH DAILY. (Patient not taking: Reported on 12/8/2023) 90 tablet 3    amoxicillin-clavulanate (AUGMENTIN) 875-125 MG tablet Take 1 tablet by mouth 2 times daily 20 tablet 0    arformoterol (BROVANA) 15 MCG/2ML NEBU neb solution Take 2 mLs (15 mcg) by nebulization 2 times daily for 360 days (Patient not taking: Reported on 9/26/2024) 360 mL 3    betaine (TMG) 500 MG CAPS capsule  (Patient not taking: Reported on 7/18/2024)      budesonide (PULMICORT) 0.5 MG/2ML neb solution Take 2 mLs (0.5 mg) by nebulization daily. 180 mL 3    dextromethorphan-quiNIDine (NUEDEXTA) 20-10 MG capsule 1 capsule daily for 1 week, then 1 capsule twice a day thereafter (Patient not taking: Reported on 7/18/2024) 60 capsule 2    escitalopram (LEXAPRO) 10 MG tablet Take 1 tablet (10 mg) by mouth daily. 90 tablet 1    Flaxseed, Linseed, (FLAX SEED OIL) 1000 MG capsule Take 1 capsule by mouth daily (Patient not taking: Reported on 7/18/2024)      magnesium 250 MG tablet Take 1 tablet by mouth daily (Patient not taking: Reported on 7/18/2024)      magnesium oxide 400 MG CAPS  (Patient not taking: Reported on 7/18/2024)      neomycin-polymyxin-hydrocortisone (CORTISPORIN) 3.5-67095-3 otic suspension Apply 3 drops to affected ear(s) 4 times daily for 10 days. Lie with affected ear upward x 5 min (Patient not taking: Reported on 6/10/2024) 10 mL 0    NONFORMULARY  (Patient not taking: Reported on 7/18/2024)      NONFORMULARY  (Patient not taking: Reported on 7/18/2024)      NONFORMULARY  (Patient not taking: Reported on 7/18/2024)      NONFORMULARY  (Patient not taking: Reported on 7/18/2024)      NONFORMULARY  (Patient not taking:  Reported on 7/18/2024)      NONFORMULARY  (Patient not taking: Reported on 7/18/2024)      NONFORMULARY pridopodine (Patient not taking: Reported on 7/18/2024)      NONFORMULARY Super amino 23 (Patient not taking: Reported on 7/18/2024)      NONFORMULARY Thyroid support (Patient not taking: Reported on 7/18/2024)      NONFORMULARY Lions maine powder (Patient not taking: Reported on 7/18/2024)      NONFORMULARY resveratrol (Patient not taking: Reported on 7/18/2024)      NONFORMULARY Castor oil (Patient not taking: Reported on 7/18/2024)      NONFORMULARY Dandelion root 1,575 (Patient not taking: Reported on 7/18/2024)      NONFORMULARY Milk thistle (Patient not taking: Reported on 7/18/2024)      NONFORMULARY Ashwagandha powder (Patient not taking: Reported on 7/18/2024)      NONFORMULARY Taurine 1,000 mg (Patient not taking: Reported on 7/18/2024)      NONFORMULARY Curcumin 500 mg (Patient not taking: Reported on 7/18/2024)      NONFORMULARY Turmeric 500 mg (Patient not taking: Reported on 7/18/2024)      NONFORMULARY Niacin 500 mg (Patient not taking: Reported on 7/18/2024)      NONFORMULARY Co q-10 100 mg (Patient not taking: Reported on 7/18/2024)      NONFORMULARY Selenium 200 mg (Patient not taking: Reported on 7/18/2024)      NONFORMULARY Turkey tail 1.5 g (Patient not taking: Reported on 7/18/2024)      NONFORMULARY Fish oil (Patient not taking: Reported on 7/18/2024)      NONFORMULARY Iron 65 mg (Patient not taking: Reported on 7/18/2024)      NONFORMULARY Zinc picolinate 50 mg (Patient not taking: Reported on 7/18/2024)      predniSONE (DELTASONE) 20 MG tablet Take two tablets (= 40mg) each day for 4 (four) days (Patient not taking: Reported on 6/10/2024) 8 tablet 0    valACYclovir (VALTREX) 1000 mg tablet Take 1 tablet (1,000 mg) by mouth 3 times daily for 7 days 21 tablet 0    vitamin B-12 (CYANOCOBALAMIN) 2500 MCG sublingual tablet Take by mouth daily 5,000 mcg (Patient not taking: Reported on  7/18/2024)      vitamin E (TOCOPHEROL) 100 units (45 mg) capsule Take 100 Units by mouth daily (Patient not taking: Reported on 7/18/2024)      vitamin E 400 units TABS Take 400 Units by mouth daily (Patient not taking: Reported on 7/18/2024)       Current Facility-Administered Medications   Medication Dose Route Frequency Provider Last Rate Last Admin    sodium chloride (PF) 0.9% PF flush 3 mL  3 mL Intracatheter q1 min prn Arianna Post PA-C   3 mL at 12/08/23 1032      No Known Allergies    Care Coordination Start Date: 3/29/2024   Frequency of Care Coordination: monthly, more frequently as needed     Form Last Updated: 12/18/2024

## 2025-01-02 ENCOUNTER — PATIENT OUTREACH (OUTPATIENT)
Dept: CARE COORDINATION | Facility: CLINIC | Age: 70
End: 2025-01-02
Payer: MEDICARE

## 2025-01-02 NOTE — PROGRESS NOTES
"Social Work -ALS Clinic Progress Note  [facility]    Data/Intervention:    Patient Name:  Rajeev Montalvo  /Age:  1955 (69 year old)    Visit Type: telephone    Collaborated With:    -Rajeev    Psychosocial info/Concerns:   See previous SW note.  Pt answered my call today. He used his text to speech device to communicate but it was cryptic, using only a few words to communicate and answer questions.     Intervention/Education/Resources Provided:  He indicated \"I'm alright\", \"I'm sad\". He indicated that Hanane left, saying that she just couldn't cope.  He indicated that he enjoys splitting wood for his wood stove and that he has friends that are checking in on him. He denies needing any help at home. He is driving and ambulatory states he is getting enough to eat.     Offered counseling and he said he would think about it but isn't ready for it yet. Inquired about thoughts of self harm and he said \"doesn't everyone think of that these days?\" On further questioning he said he loves life and said he's still physical. He has friends, pets that depend on him and things he likes to do. He does own firearms and is giving some away. He indicated he has no intentions or plans for self harm now and indicated \"I would never shoot myself\". He made some comment about possibly giving all of his guns away after the  when Ismael is sworn into office. He also said he is \"at peace\" with his disease and the future.     Provided info re his upcoming clinic appts in . He said he isn't sure if he is going to come. He wonders what we can do for him. Discussed that there are some medications, help with communication devices, etc. He said he would let us know if he's not coming. He took down my phone #, our nurse Lindsey's and also the [facility] Crisis # which I encouraged him to call when he is feeling sad.     He asked me about how long I have been doing this work and said he feels bad for me having " to do this job. He asked if any ALS Pt ever gets better and I indicated that they haven't. Also indicated that they are still working on finding a cure.     Assessment/Plan:  Pt seems appropriately sad with the loss of his SO of several years. He also seemed lonely and really appreciated my phone call. He said he will think about counseling and coming to his next ALS clinic appt. He denies needing any help at home now. He wasn't confused and answered questions appropriately.   Hanane previously indicated that they had seen a  and done some legal documents such as a $POA. We don't have a health care directive which we can discuss at a future appt.   Will continue to follow in ALS clinic. If he stops coming to clinic (which I suspect he will) will notify his PCP/care coordination.     Provided patient/family with contact information and encouraged them to contact me between clinic visits as needed.     Connie Fox, PEY[place], [facility]    [facility]  [phone]

## 2025-01-08 ENCOUNTER — PATIENT OUTREACH (OUTPATIENT)
Dept: CARE COORDINATION | Facility: CLINIC | Age: 70
End: 2025-01-08
Payer: MEDICARE

## 2025-01-08 NOTE — PROGRESS NOTES
Clinic Care Coordination Contact  [facility]/Voicemail    Clinical Data: Care Coordinator Outreach    Outreach Documentation Number of Outreach Attempt   1/8/2025   1:30 PM 1     Left message on patient's voicemail with call back information and requested return call.    Plan: Care Coordinator will try to reach patient again in 10 business days.    Radha Telles RN, BSN, CPHN, [facility] Ambulatory Care Management  [facility], and [facility]  [email or web address]  Office: [phone]  Employed by [facility]

## 2025-01-14 DIAGNOSIS — G12.21 ALS (AMYOTROPHIC LATERAL SCLEROSIS) (H): Primary | ICD-10-CM

## 2025-01-22 ENCOUNTER — TELEPHONE (OUTPATIENT)
Dept: NEUROLOGY | Facility: CLINIC | Age: 70
End: 2025-01-22
Payer: MEDICARE

## 2025-01-22 DIAGNOSIS — G12.21 ALS (AMYOTROPHIC LATERAL SCLEROSIS) (H): Primary | ICD-10-CM

## 2025-01-22 NOTE — TELEPHONE ENCOUNTER
VM received from patient stating BiPAP pressure settings were too strong and requesting they be adjusted. Contacted [employer] who indicated settings were adjusted end of October and patient has not used the machine since. Medicare requires new BiPAP orders as well as face to face notes before they can provide further service to the machine.  Patient requested re-schedule of Gastrostomy a few weeks ago and has not returned call to IR to schedule.  Spoke to patient explaining above concerns and pt agreed to come to clinic 1/23/2025 to address these concerns.   Advised pt to use BiPAP during the daytime if he is unable to tolerate at bedtime.

## 2025-01-23 ENCOUNTER — TELEPHONE (OUTPATIENT)
Dept: NEUROLOGY | Facility: CLINIC | Age: 70
End: 2025-01-23

## 2025-01-23 ENCOUNTER — PATIENT OUTREACH (OUTPATIENT)
Dept: CARE COORDINATION | Facility: CLINIC | Age: 70
End: 2025-01-23

## 2025-01-23 NOTE — LETTER
[facility] COORDINATION  No Primary Care Provider address on file    January 23, 2025    Rajeev Linne  [address] [facility] [address]      Dear Rajeev,       Through your most recent voicemail message to me, you shared that you know how to contact your Neurology care team if you have questions or need resources related to your ALS.  Since you have voiced no specific resource or support needs from our Primary Care- Care Coordination team, we will make no further attempts to reach you. This does not change your ability to continue receiving care from your providers within [facility].  We would encourage you to maintain routine visits with a primary care provider, either through [facility] or at a clinic of your choice, to support preventative care and overall health and wellbeing.     We are focused on providing you with the highest-quality healthcare experience possible.     Sincerely,    Radha Telles, RN, BSN, CPHN, [facility] Ambulatory Care Management  [facility], and [facility]  [email or web address]  Office: [phone]  Employed by [employer]

## 2025-01-23 NOTE — PROGRESS NOTES
Clinic Care Coordination Contact    Situation: Patient chart reviewed by care coordinator.    Background: Background: RN CC performing additional chart review following most recent outreach attempts to patient on 1/22/25 (see 1/8/25 encounter for recent communication).     Assessment: Patient was able to connect with his Neurology team to further discuss his questions around his home BiPAP settings.  The Neurology RN had coordinated a clinic appointment on 1/23/25.  From chart notes, patient called clinic in the morning and cancelled appointment.  No further appointment is scheduled at this time.    Plan: Patient has not engaged in Primary Care-Care Coordination in most recent outreach attempts and progress towards previously stated goals has stalled.  He has not elected to establish primary care through [facility].  Patient has specialty care team support through his Neurology clinic as needed and has voiced to his care team that he has support from friends/family in the community.   At this time, RN CC will close patient to Care Coordination support.  Disenrollment letter will be send via [employer].  Should the patient proceed with establishing care within [facility] Primary Care in the future and have additional support needs, a new referral to Care Coordination can be placed.    Radha Telles RN, BSN, CPHN, [facility] Ambulatory Care Management  [facility], and [facility]  [email or web address]  Office: [phone]  Employed by [employer]

## 2025-01-30 ENCOUNTER — PATIENT OUTREACH (OUTPATIENT)
Dept: CARE COORDINATION | Facility: CLINIC | Age: 70
End: 2025-01-30
Payer: MEDICARE

## 2025-01-30 NOTE — PROGRESS NOTES
Social Work [id number]  [facility]    Data/Intervention:  Patient Name:  Rajeev Montalvo  /Age:  1955 (69 year old)    Reason for Follow-Up:  cancelled appts    Collaborated With:    -Rajeev    Intervention/Education/Resources Provided:  Pt wanted to be seen for possible gtube placement and also concerns about his bipap machine and was scheduled x2 and cancelled x2 on the day of the ALS clinic appts. He had someone call and leave me a message last week indicating he wanted to cancel his appt.   I left Pt a message today indicating I'm just checking in with him and encouraged him to call Lindsey Cadena LPN if he wants to reschedule.     Assessment/Plan:  Will await Pt's return phone call.     Previously provided patient/family with writer's contact information and availability.       Connie Fox, MSW, [facility]    [facility]  [address] [place] [id number] [phone]

## 2025-02-05 ENCOUNTER — DOCUMENTATION ONLY (OUTPATIENT)
Dept: CARE COORDINATION | Facility: CLINIC | Age: 70
End: 2025-02-05
Payer: MEDICARE

## 2025-02-05 NOTE — PROGRESS NOTES
Social Work Note- ALS clinic  [facility]    Referral made to [facility] report # [id number]    [place] Davis, [facility]    [facility]  [address] [place] [id number] [phone]

## 2025-02-20 NOTE — PROGRESS NOTES
Called and spoke with patient. Notified of the notes below. She verbalized understanding.   [place], September 26, 2024    I had the pleasure to see Mr. 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  I first saw him 3 months ago when he reported a 1 year history of progressive dysarthria and dysphagia.  He also had sialorrhea.  T the [facility]SA examination showed combined upper and lower motor neuron signs in the bulbar region.  There was no upper or lower limb weakness, muscle atrophy, or spasticity.  He declined an EMG study.  Brain MRI did not show an alternative explanation for his symptoms.  At the initial visit, we discussed medications like riluzole and Radicava, and the patient declined them.  He was also not interested in genetic testing or receiving medications for sialorrhea.    Because of abnormal pulmonary function test with an unusual (for ALS) obstructive pattern, I referred him to pulmonology and he saw Dr. Borrego in 7/2024.  He repeated the patient spirometry at that time and MIP was found to be -15 cmH2O (compared to -59 at the original visit in our clinic on June 2024.  The patient was also reporting orthopnea.  On those grounds, Dr. Borrego Prescribed BiPAP to the patient.  He is trying to use the mask every night, but she cannot tolerate it at all times due to very high pressures, in his opinion.  Rajeev denies dyspnea on exertion and he does not use extra pillows at night.    His sialorrhea continues, and sometimes it bothers him.  He has difficulty clearing thick secretions/phlegm, and he does have a CoughAssist device at home, but he does not use it.    His dysarthria has somewhat progressed and he uses a buggy board to communicate.  He is interested in alternative augmentative communication devices.  He does have mild pseudobulbar affect, but it is not particularly bothersome.    His wife believes that his dysphagia has somewhat worsened 2.  He has lost another 8 pounds since the last  "visit and occasionally chokes on solids and liquids.    He continues to deny any upper or lower extremity weakness, or clumsiness.    BP (!) 174/90 (BP Location: Left arm, Patient Position: Sitting, Cuff Size: Adult Regular)   Pulse 51   Resp 16   Ht 1.72 m (5' 7.72\")   Wt 78.4 kg (172 lb 12.8 oz)   SpO2 99%   BMI 26.49 kg/m          Latest Ref Rng & Units 9/26/2024     8:58 AM   PFT   FVC L 3.74  P   FEV1 L 3.01  P   FVC% % 97  P   FEV1% % 102  P      P Preliminary result     MIP -48 cm H2O today, which was quite better than the value obtained during Dr. Borrego's visit in 7/2024.  This, in my opinion, suggests poor performance in the previous test.    EXAM: He is awake, alert, oriented x 3.  On the focused examination, there is no weakness, muscle atrophy, or fasciculations in the upper or lower extremities.  He continues with severe spastic dysarthria, minimally intelligible.    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  I explained to him that there is a significant difference in prognosis between the 2 entities, because the average survival for pure PBP, which is quite rare, is often 5 years or more, whereas bulbar onset ALS survival is typically less than 3 years, and sometimes less than 2.  In this case, an EMG would be necessary to differentiate, because clinically there is no evidence of upper or lower motor neuron signs in the cervical, thoracic, or lumbar region.  Despite the important role of an EMG study in determining his prognosis, he continues to decline it.    He continues to decline FDA approved medications for ALS or genetic testing.  He was interested in medications for sialorrhea, and I will prescribe him Robinul 1 mg 3 times daily.    We had an extensive discussion about gastrostomy placement.  I think it is medically indicated at this point, and the patient believes that this is acceptable.  I will notify our nurse. Our speech-language pathologist and registered dietitian will talk " to him in detail about this, and we will plan to do the procedure in the next 2 months.    Today, I am not so convinced about the presence of neuromuscular respiratory failure related to ALS.  His FVC and FEV1 is excellent, and his MIP is much better than the value obtained by Dr. Borrego in 7/2024. MIP of --48 cm H2O in a patient with bulbar palsy may be simply due to inability to do a full lip seal.  In any case, if Dr. Borrego recommends continuing NIV, I am okay with it.    He has pseudobulbar affect, but it is mild, and we will defer medications for now.    He will talk to our research coordinator today.  He was interesting in acupuncture and electrical needle stimulation of the throat muscles and the tongue.  I am not familiar with those approaches and there is no evidence ( I am aware of) that any of those would be beneficial for ALS.  Therefore, I did not recommend them.    I will see him in follow-up in 3 months, sooner if needed.    Sincerely,    Jorge Jameson MD, FAAN    The longitudinal plan of care for the diagnosis(es)/condition(s) as documented were addressed during this visit. Due to the added complexity in care, I will continue to support Rajeev in the subsequent management and with ongoing continuity of care.    Billing MDM level 4 (moderate) based on 1) Problems assessed: One chronic disorder with progression, exacerbation, or side effects of treatment (ALS) and 2)Risk: prescription drug management, see above.    ADDENDUM: 1) Rajeev has seasonal affective disorder/depression and he asked for an antidepressant medication. I will prescribe him Lexapro 10 mg daily.  2) His CBS showed very low behavioral sub-scale score (15/45) and also abnormal cognitive score (12/30). Those results call for more detailed neuropsych testing which I will order. GM

## 2025-04-07 ENCOUNTER — TELEPHONE (OUTPATIENT)
Dept: PULMONOLOGY | Facility: CLINIC | Age: 70
End: 2025-04-07
Payer: MEDICARE

## 2025-04-07 NOTE — TELEPHONE ENCOUNTER
RN called pt and LVM letting him know that if he does not return his bipap that he will get charged for the equipment and that his insurance is no longer going to pay for the bipap due to the lack of use.     Xochitl MELENDEZ RN  Pulmonary Nurse Care Coordinator

## 2025-04-07 NOTE — TELEPHONE ENCOUNTER
[facility] Call Center    Phone Message    May a detailed message be left on voicemail: yes     Reason for Call: Call received from Otilia, respiratory therapist from [facility]-she reports they are having some difficulties with getting pt's bipap back from him. She reports that pt has had it since last July 2024, but was not using it. When they reached out to him several months ago to get it back, he had become upset and said that he still wanted to use it, so they allowed him to keep it for another 90 days. Pt used his device for only 2 days out of that 90 days, so his insurance will no longer keep paying for him to have that equipment (insurance stopped paying for it as of September 2024, pt is just refusing to return his equipment).   Otilia/[employer] are wondering if Dr. Borrego could assist with getting him to return his device? Otilia can be reached at [phone].       Action Taken: Other: Pulm    Travel Screening: Not Applicable

## 2025-04-07 NOTE — TELEPHONE ENCOUNTER
RN called Otilia from [employer] back and LVM letting her know that RN tried to call the pt.     Xochitl MELENDEZ RN  Pulmonary Nurse Care Coordinator

## 2025-06-14 ENCOUNTER — HEALTH MAINTENANCE LETTER (OUTPATIENT)
Age: 70
End: 2025-06-14

## 2025-07-16 ENCOUNTER — TELEPHONE (OUTPATIENT)
Dept: NEUROLOGY | Facility: CLINIC | Age: 70
End: 2025-07-16
Payer: MEDICARE

## 2025-07-16 NOTE — TELEPHONE ENCOUNTER
Health Call Center    Phone Message    May a detailed message be left on voicemail: yes     Reason for Call: Other: Lata from  hospice called and stated that patient had reached out requesting hospice services from them. Lata reports that they are not able to help pt with their services, but don't have any way to contact pt. Lata requested for a message to be sent to Dr Jameson with the information.       Action Taken: Message routed to:  Other: Neurology    Travel Screening: Not Applicable     Date of Service:

## 2025-07-17 NOTE — TELEPHONE ENCOUNTER
Spoke to  Hospice. They will need to notify ordering provider vs Dr Jameson.  Spoke to pt and let him know  Hospice cannot admit him as he is located outside of their service are.